# Patient Record
Sex: FEMALE | Race: WHITE | Employment: UNEMPLOYED | URBAN - METROPOLITAN AREA
[De-identification: names, ages, dates, MRNs, and addresses within clinical notes are randomized per-mention and may not be internally consistent; named-entity substitution may affect disease eponyms.]

---

## 2018-10-06 ENCOUNTER — APPOINTMENT (OUTPATIENT)
Dept: CT IMAGING | Age: 23
End: 2018-10-06
Payer: COMMERCIAL

## 2018-10-06 ENCOUNTER — HOSPITAL ENCOUNTER (EMERGENCY)
Age: 23
Discharge: HOME | End: 2018-10-06
Attending: EMERGENCY MEDICINE
Payer: COMMERCIAL

## 2018-10-06 VITALS
RESPIRATION RATE: 16 BRPM | BODY MASS INDEX: 36.31 KG/M2 | SYSTOLIC BLOOD PRESSURE: 94 MMHG | DIASTOLIC BLOOD PRESSURE: 61 MMHG | OXYGEN SATURATION: 100 % | HEIGHT: 63 IN | TEMPERATURE: 97.2 F | HEART RATE: 84 BPM

## 2018-10-06 DIAGNOSIS — N83.202 CYST OF LEFT OVARY: ICD-10-CM

## 2018-10-06 DIAGNOSIS — R10.9 ABDOMINAL PAIN, UNSPECIFIED ABDOMINAL LOCATION: ICD-10-CM

## 2018-10-06 DIAGNOSIS — A08.4 STOMACH FLU: Primary | ICD-10-CM

## 2018-10-06 LAB
ACETAMINOPHEN LEVEL: <5 MCG/ML (ref 10–30)
ALBUMIN SERPL-MCNC: 4.4 G/DL (ref 3.5–5.2)
ALP BLD-CCNC: 97 U/L (ref 35–104)
ALT SERPL-CCNC: 7 U/L (ref 0–32)
AMPHETAMINE SCREEN, URINE: NOT DETECTED
ANION GAP SERPL CALCULATED.3IONS-SCNC: 14 MMOL/L (ref 7–16)
AST SERPL-CCNC: 16 U/L (ref 0–31)
BACTERIA: ABNORMAL /HPF
BARBITURATE SCREEN URINE: NOT DETECTED
BASOPHILS ABSOLUTE: 0.02 E9/L (ref 0–0.2)
BASOPHILS RELATIVE PERCENT: 0.2 % (ref 0–2)
BENZODIAZEPINE SCREEN, URINE: NOT DETECTED
BILIRUB SERPL-MCNC: 1.7 MG/DL (ref 0–1.2)
BILIRUBIN DIRECT: 0.3 MG/DL (ref 0–0.3)
BILIRUBIN URINE: NEGATIVE
BILIRUBIN, INDIRECT: 1.4 MG/DL (ref 0–1)
BLOOD, URINE: ABNORMAL
BUN BLDV-MCNC: 15 MG/DL (ref 6–20)
CALCIUM SERPL-MCNC: 9.9 MG/DL (ref 8.6–10.2)
CANNABINOID SCREEN URINE: NOT DETECTED
CHLORIDE BLD-SCNC: 104 MMOL/L (ref 98–107)
CHP ED QC CHECK: YES
CLARITY: CLEAR
CO2: 21 MMOL/L (ref 22–29)
COCAINE METABOLITE SCREEN URINE: NOT DETECTED
COLOR: YELLOW
CREAT SERPL-MCNC: 0.7 MG/DL (ref 0.5–1)
EOSINOPHILS ABSOLUTE: 0 E9/L (ref 0.05–0.5)
EOSINOPHILS RELATIVE PERCENT: 0 % (ref 0–6)
EPITHELIAL CELLS, UA: ABNORMAL /HPF
ETHANOL: <10 MG/DL (ref 0–0.08)
GFR AFRICAN AMERICAN: >60
GFR NON-AFRICAN AMERICAN: >60 ML/MIN/1.73
GLUCOSE BLD-MCNC: 136 MG/DL (ref 74–109)
GLUCOSE URINE: NEGATIVE MG/DL
HCT VFR BLD CALC: 36 % (ref 34–48)
HEMOGLOBIN: 12.2 G/DL (ref 11.5–15.5)
IMMATURE GRANULOCYTES #: 0.04 E9/L
IMMATURE GRANULOCYTES %: 0.4 % (ref 0–5)
KETONES, URINE: >=80 MG/DL
LACTIC ACID: 1.3 MMOL/L (ref 0.5–2.2)
LEUKOCYTE ESTERASE, URINE: NEGATIVE
LIPASE: 21 U/L (ref 13–60)
LYMPHOCYTES ABSOLUTE: 0.67 E9/L (ref 1.5–4)
LYMPHOCYTES RELATIVE PERCENT: 7 % (ref 20–42)
MCH RBC QN AUTO: 30.3 PG (ref 26–35)
MCHC RBC AUTO-ENTMCNC: 33.9 % (ref 32–34.5)
MCV RBC AUTO: 89.6 FL (ref 80–99.9)
METHADONE SCREEN, URINE: NOT DETECTED
MONOCYTES ABSOLUTE: 0.33 E9/L (ref 0.1–0.95)
MONOCYTES RELATIVE PERCENT: 3.4 % (ref 2–12)
NEUTROPHILS ABSOLUTE: 8.55 E9/L (ref 1.8–7.3)
NEUTROPHILS RELATIVE PERCENT: 89 % (ref 43–80)
NITRITE, URINE: NEGATIVE
OPIATE SCREEN URINE: NOT DETECTED
PDW BLD-RTO: 13.2 FL (ref 11.5–15)
PH UA: 5.5 (ref 5–9)
PHENCYCLIDINE SCREEN URINE: NOT DETECTED
PLATELET # BLD: 160 E9/L (ref 130–450)
PMV BLD AUTO: 13.6 FL (ref 7–12)
POTASSIUM SERPL-SCNC: 3.7 MMOL/L (ref 3.5–5)
PREGNANCY TEST URINE, POC: NEGATIVE
PROPOXYPHENE SCREEN: NOT DETECTED
PROTEIN UA: NEGATIVE MG/DL
RBC # BLD: 4.02 E12/L (ref 3.5–5.5)
RBC UA: ABNORMAL /HPF (ref 0–2)
SALICYLATE, SERUM: <0.3 MG/DL (ref 0–30)
SODIUM BLD-SCNC: 139 MMOL/L (ref 132–146)
SPECIFIC GRAVITY UA: >=1.03 (ref 1–1.03)
TOTAL PROTEIN: 7.4 G/DL (ref 6.4–8.3)
TRICYCLIC ANTIDEPRESSANTS SCREEN SERUM: NEGATIVE NG/ML
UROBILINOGEN, URINE: 0.2 E.U./DL
WBC # BLD: 9.6 E9/L (ref 4.5–11.5)
WBC UA: ABNORMAL /HPF (ref 0–5)

## 2018-10-06 PROCEDURE — G0480 DRUG TEST DEF 1-7 CLASSES: HCPCS

## 2018-10-06 PROCEDURE — 83690 ASSAY OF LIPASE: CPT

## 2018-10-06 PROCEDURE — 81001 URINALYSIS AUTO W/SCOPE: CPT

## 2018-10-06 PROCEDURE — 74176 CT ABD & PELVIS W/O CONTRAST: CPT

## 2018-10-06 PROCEDURE — 2580000003 HC RX 258: Performed by: PHYSICIAN ASSISTANT

## 2018-10-06 PROCEDURE — 80048 BASIC METABOLIC PNL TOTAL CA: CPT

## 2018-10-06 PROCEDURE — 99284 EMERGENCY DEPT VISIT MOD MDM: CPT

## 2018-10-06 PROCEDURE — 85025 COMPLETE CBC W/AUTO DIFF WBC: CPT

## 2018-10-06 PROCEDURE — 6360000002 HC RX W HCPCS: Performed by: PHYSICIAN ASSISTANT

## 2018-10-06 PROCEDURE — 74177 CT ABD & PELVIS W/CONTRAST: CPT

## 2018-10-06 PROCEDURE — 96374 THER/PROPH/DIAG INJ IV PUSH: CPT

## 2018-10-06 PROCEDURE — 6360000004 HC RX CONTRAST MEDICATION: Performed by: RADIOLOGY

## 2018-10-06 PROCEDURE — 96375 TX/PRO/DX INJ NEW DRUG ADDON: CPT

## 2018-10-06 PROCEDURE — 80076 HEPATIC FUNCTION PANEL: CPT

## 2018-10-06 PROCEDURE — 80307 DRUG TEST PRSMV CHEM ANLYZR: CPT

## 2018-10-06 PROCEDURE — 83605 ASSAY OF LACTIC ACID: CPT

## 2018-10-06 RX ORDER — 0.9 % SODIUM CHLORIDE 0.9 %
1000 INTRAVENOUS SOLUTION INTRAVENOUS ONCE
Status: COMPLETED | OUTPATIENT
Start: 2018-10-06 | End: 2018-10-06

## 2018-10-06 RX ORDER — KETOROLAC TROMETHAMINE 30 MG/ML
15 INJECTION, SOLUTION INTRAMUSCULAR; INTRAVENOUS ONCE
Status: COMPLETED | OUTPATIENT
Start: 2018-10-06 | End: 2018-10-06

## 2018-10-06 RX ORDER — ONDANSETRON 2 MG/ML
4 INJECTION INTRAMUSCULAR; INTRAVENOUS ONCE
Status: COMPLETED | OUTPATIENT
Start: 2018-10-06 | End: 2018-10-06

## 2018-10-06 RX ORDER — ONDANSETRON 4 MG/1
4 TABLET, ORALLY DISINTEGRATING ORAL EVERY 8 HOURS PRN
Qty: 12 TABLET | Refills: 0 | Status: SHIPPED | OUTPATIENT
Start: 2018-10-06 | End: 2018-10-22 | Stop reason: ALTCHOICE

## 2018-10-06 RX ADMIN — ONDANSETRON 4 MG: 2 INJECTION INTRAMUSCULAR; INTRAVENOUS at 02:46

## 2018-10-06 RX ADMIN — IOHEXOL 50 ML: 240 INJECTION, SOLUTION INTRATHECAL; INTRAVASCULAR; INTRAVENOUS; ORAL at 06:51

## 2018-10-06 RX ADMIN — IOPAMIDOL 110 ML: 755 INJECTION, SOLUTION INTRAVENOUS at 03:29

## 2018-10-06 RX ADMIN — SODIUM CHLORIDE 1000 ML: 9 INJECTION, SOLUTION INTRAVENOUS at 02:46

## 2018-10-06 RX ADMIN — KETOROLAC TROMETHAMINE 15 MG: 30 INJECTION, SOLUTION INTRAMUSCULAR at 02:47

## 2018-10-06 ASSESSMENT — PAIN DESCRIPTION - ORIENTATION: ORIENTATION: RIGHT;LOWER

## 2018-10-06 ASSESSMENT — PAIN DESCRIPTION - PAIN TYPE: TYPE: ACUTE PAIN

## 2018-10-06 ASSESSMENT — PAIN SCALES - GENERAL
PAINLEVEL_OUTOF10: 7
PAINLEVEL_OUTOF10: 7
PAINLEVEL_OUTOF10: 0

## 2018-10-06 ASSESSMENT — PAIN DESCRIPTION - LOCATION: LOCATION: ABDOMEN

## 2018-10-06 NOTE — ED PROVIDER NOTES
Interpretation: Normal.    · General Appearance/Constitutional:  Alert, development consistent with age. · HEENT:  NC/NT. PERRLA. Airway patent. · Neck:  Supple. No lymphadenopathy. · Respiratory:  No retractions. Lungs Clear to auscultation and breath sounds equal.  · CV:  Regular rate and rhythm. · GI:  General Appearance: normal. Excess skin from previous obese state. Bowel sounds: hypoactive bowel sounds. Distension:  None. Tenderness: mild tenderness is present in the right flank, no rebound tenderness, no guarding, abdominal rigidity is absent. Liver: non-tender. Spleen:  non-tender. Pulsatile Mass: absent. Hernia:  no inguinal or femoral hernias noted. · Back: CVA Tenderness: No.  · : (chaperone present during examination). · Integument:  Normal turgor. Warm, dry, without visible rash, unless noted elsewhere. · Lymphatics: No edema, cap.refill <3sec. · Neurological:  Orientation age-appropriate. Motor functions intact.     Lab / Imaging Results   (All laboratory and radiology results have been personally reviewed by myself)  Labs:  Results for orders placed or performed during the hospital encounter of 10/06/18   CBC Auto Differential   Result Value Ref Range    WBC 9.6 4.5 - 11.5 E9/L    RBC 4.02 3.50 - 5.50 E12/L    Hemoglobin 12.2 11.5 - 15.5 g/dL    Hematocrit 36.0 34.0 - 48.0 %    MCV 89.6 80.0 - 99.9 fL    MCH 30.3 26.0 - 35.0 pg    MCHC 33.9 32.0 - 34.5 %    RDW 13.2 11.5 - 15.0 fL    Platelets 715 755 - 162 E9/L    MPV 13.6 (H) 7.0 - 12.0 fL    Neutrophils % 89.0 (H) 43.0 - 80.0 %    Immature Granulocytes % 0.4 0.0 - 5.0 %    Lymphocytes % 7.0 (L) 20.0 - 42.0 %    Monocytes % 3.4 2.0 - 12.0 %    Eosinophils % 0.0 0.0 - 6.0 %    Basophils % 0.2 0.0 - 2.0 %    Neutrophils # 8.55 (H) 1.80 - 7.30 E9/L    Immature Granulocytes # 0.04 E9/L    Lymphocytes # 0.67 (L) 1.50 - 4.00 E9/L    Monocytes # 0.33 0.10 - 0.95 agreeable with the plan. Assessment    Per Dr. Dickson Bateman Medications     New Prescriptions    No medications on file     Electronically signed by Dao Dc PA-C   DD: 10/6/18  **This report was transcribed using voice recognition software. Every effort was made to ensure accuracy; however, inadvertent computerized transcription errors may be present.   END OF ED PROVIDER NOTE       Dao Dc PA-C  10/06/18 [id number]

## 2018-10-06 NOTE — ED NOTES
Ambulatory to bathroom with steady gait, iv fluids maintained, awaiting results and disposition     Gail Matias  10/06/18 [id number]

## 2018-10-22 ENCOUNTER — TELEPHONE (OUTPATIENT)
Dept: NON INVASIVE DIAGNOSTICS | Age: 23
End: 2018-10-22

## 2018-10-22 PROBLEM — L70.9 ACNE: Status: ACTIVE | Noted: 2018-10-22

## 2018-10-23 ENCOUNTER — NURSE ONLY (OUTPATIENT)
Dept: NON INVASIVE DIAGNOSTICS | Age: 23
End: 2018-10-23

## 2018-10-23 NOTE — PROGRESS NOTES
Patient in today for 24 hour holter monitor. Holter monitor applied and instructions given. Patient verbalized understanding.

## 2018-10-31 ENCOUNTER — TELEPHONE (OUTPATIENT)
Dept: CARDIOLOGY | Age: 23
End: 2018-10-31

## 2018-10-31 NOTE — TELEPHONE ENCOUNTER
CALLED PATIENT TO CANCEL ECHO APPOINTMENT DUE TO AUTH IS PENDING FROM PCP. I CALLED PCP ON 10-26 @ 11;36 AND SPOKE WITH FREDERICK ABOUT  AN AUTH FOR ECHO.  SHE SAID NO AND WILL BE WORKING ON IT. ON 10-31 AUTH IS STILL PENDING @10:07AM,  Electronically signed by Marilyn Bartlett on 10/31/2018 at 2:49 PM

## 2018-11-30 ENCOUNTER — HOSPITAL ENCOUNTER (OUTPATIENT)
Dept: CARDIOLOGY | Age: 23
Discharge: HOME | End: 2018-11-30
Payer: COMMERCIAL

## 2018-11-30 LAB
LV EF: 60 %
LVEF MODALITY: NORMAL

## 2018-11-30 PROCEDURE — 93306 TTE W/DOPPLER COMPLETE: CPT

## 2018-12-14 PROBLEM — R00.0 TACHYCARDIA: Status: ACTIVE | Noted: 2018-12-14

## 2019-01-08 ENCOUNTER — HOSPITAL ENCOUNTER (EMERGENCY)
Age: 24
Discharge: LEFT BEFORE DISCHARGE | End: 2019-01-08
Payer: COMMERCIAL

## 2019-01-08 VITALS
TEMPERATURE: 98 F | HEART RATE: 90 BPM | OXYGEN SATURATION: 99 % | RESPIRATION RATE: 16 BRPM | BODY MASS INDEX: 26.63 KG/M2 | WEIGHT: 156 LBS | HEIGHT: 64 IN

## 2019-01-08 ASSESSMENT — PAIN DESCRIPTION - PAIN TYPE: TYPE: ACUTE PAIN

## 2019-01-08 ASSESSMENT — PAIN DESCRIPTION - LOCATION: LOCATION: RECTUM

## 2019-01-08 ASSESSMENT — PAIN SCALES - GENERAL: PAINLEVEL_OUTOF10: 5

## 2024-02-19 ENCOUNTER — NURSE TRIAGE (OUTPATIENT)
Dept: OTHER | Facility: CLINIC | Age: 29
End: 2024-02-19

## 2024-02-19 NOTE — TELEPHONE ENCOUNTER
Location of patient: [place]    Received call from Batsheva at [facility] with Red Flag Complaint.    Subjective: Caller and pt. Mother states not sure if blood sugar is high,  Dry mouth and voiding frequently   Weight loss 20 lbs in the past 6 months    Current Symptoms: frequent urination and dry mouth for the past year    Onset: 1 year ago     Associated Symptoms: NA    Pain Severity: denies     Temperature: denies     What has been tried:     LMP:  few weeks  ago Pregnant:no    Recommended disposition: See in Office Today/UCC if no appt available     Care advice provided, patient verbalizes understanding; denies any other questions or concerns; instructed to call back for any new or worsening symptoms.    Patient/Caller agrees with recommended disposition; writer provided warm transfer to Yolanda at [facility] for appointment scheduling    Attention Provider:  Thank you for allowing me to participate in the care of your patient.  The patient was connected to triage in response to information provided to the [facility].  Please do not respond through this encounter as the response is not directed to a shared pool.       Reason for Disposition   Symptoms of high blood sugar (e.g., abnormally thirsty, frequent urination, weight loss) and not able to test blood glucose    Protocols used: Diabetes - High Blood Sugar-ADULT-[place]

## 2024-03-06 ENCOUNTER — OFFICE VISIT (OUTPATIENT)
Dept: PRIMARY CARE CLINIC | Age: 29
End: 2024-03-06

## 2024-03-06 VITALS
RESPIRATION RATE: 17 BRPM | WEIGHT: 130 LBS | DIASTOLIC BLOOD PRESSURE: 82 MMHG | SYSTOLIC BLOOD PRESSURE: 128 MMHG | HEART RATE: 78 BPM | OXYGEN SATURATION: 98 % | HEIGHT: 64 IN | BODY MASS INDEX: 22.2 KG/M2 | TEMPERATURE: 98 F

## 2024-03-06 DIAGNOSIS — Z76.89 ENCOUNTER TO ESTABLISH CARE: ICD-10-CM

## 2024-03-06 DIAGNOSIS — Z11.59 NEED FOR HEPATITIS C SCREENING TEST: ICD-10-CM

## 2024-03-06 DIAGNOSIS — R35.89 POLYURIA: ICD-10-CM

## 2024-03-06 DIAGNOSIS — Z11.4 SCREENING FOR HIV WITHOUT PRESENCE OF RISK FACTORS: ICD-10-CM

## 2024-03-06 DIAGNOSIS — R63.4 WEIGHT LOSS: ICD-10-CM

## 2024-03-06 DIAGNOSIS — R73.9 HYPERGLYCEMIA: ICD-10-CM

## 2024-03-06 DIAGNOSIS — Z72.89 OTHER PROBLEMS RELATED TO LIFESTYLE: ICD-10-CM

## 2024-03-06 DIAGNOSIS — E04.9 THYROID ENLARGEMENT: ICD-10-CM

## 2024-03-06 DIAGNOSIS — R68.2 DRY MOUTH: ICD-10-CM

## 2024-03-06 DIAGNOSIS — Z00.00 ENCOUNTER FOR WELL ADULT EXAM WITHOUT ABNORMAL FINDINGS: Primary | ICD-10-CM

## 2024-03-06 PROBLEM — R00.0 TACHYCARDIA: Status: RESOLVED | Noted: 2018-12-14 | Resolved: 2024-03-06

## 2024-03-06 SDOH — ECONOMIC STABILITY: FOOD INSECURITY: WITHIN THE PAST 12 MONTHS, YOU WORRIED THAT YOUR FOOD WOULD RUN OUT BEFORE YOU GOT MONEY TO BUY MORE.: NEVER TRUE

## 2024-03-06 SDOH — ECONOMIC STABILITY: HOUSING INSECURITY
IN THE LAST 12 MONTHS, WAS THERE A TIME WHEN YOU DID NOT HAVE A STEADY PLACE TO SLEEP OR SLEPT IN A SHELTER (INCLUDING NOW)?: NO

## 2024-03-06 SDOH — ECONOMIC STABILITY: INCOME INSECURITY: HOW HARD IS IT FOR YOU TO PAY FOR THE VERY BASICS LIKE FOOD, HOUSING, MEDICAL CARE, AND HEATING?: NOT HARD AT ALL

## 2024-03-06 SDOH — ECONOMIC STABILITY: FOOD INSECURITY: WITHIN THE PAST 12 MONTHS, THE FOOD YOU BOUGHT JUST DIDN'T LAST AND YOU DIDN'T HAVE MONEY TO GET MORE.: NEVER TRUE

## 2024-03-06 ASSESSMENT — PATIENT HEALTH QUESTIONNAIRE - PHQ9
1. LITTLE INTEREST OR PLEASURE IN DOING THINGS: 0
SUM OF ALL RESPONSES TO PHQ QUESTIONS 1-9: 0
SUM OF ALL RESPONSES TO PHQ QUESTIONS 1-9: 0
2. FEELING DOWN, DEPRESSED OR HOPELESS: 0
SUM OF ALL RESPONSES TO PHQ QUESTIONS 1-9: 0
SUM OF ALL RESPONSES TO PHQ QUESTIONS 1-9: 0
SUM OF ALL RESPONSES TO PHQ9 QUESTIONS 1 & 2: 0

## 2024-03-06 ASSESSMENT — ANXIETY QUESTIONNAIRES
4. TROUBLE RELAXING: 1
2. NOT BEING ABLE TO STOP OR CONTROL WORRYING: 1
7. FEELING AFRAID AS IF SOMETHING AWFUL MIGHT HAPPEN: 1
5. BEING SO RESTLESS THAT IT IS HARD TO SIT STILL: 1
6. BECOMING EASILY ANNOYED OR IRRITABLE: 1
1. FEELING NERVOUS, ANXIOUS, OR ON EDGE: 1
IF YOU CHECKED OFF ANY PROBLEMS ON THIS QUESTIONNAIRE, HOW DIFFICULT HAVE THESE PROBLEMS MADE IT FOR YOU TO DO YOUR WORK, TAKE CARE OF THINGS AT HOME, OR GET ALONG WITH OTHER PEOPLE: NOT DIFFICULT AT ALL
3. WORRYING TOO MUCH ABOUT DIFFERENT THINGS: 1
GAD7 TOTAL SCORE: 7

## 2024-03-06 NOTE — PATIENT INSTRUCTIONS
Advance Care Planning     Advance Care Planning opens a door to talk about and write down your wishes before a sudden accident or illness.  Make your goals, values, and preferences known.     This puts you in the ’s seat and helps others know what matters most to you so they won’t have to guess.      Where can you learn more?    Go to [email or web address]   to learn how to:    Name someone you trust to make healthcare decisions for you, only if you can’t. (Healthcare Power of )    Document your wishes for care if you were seriously ill and not expected to recover or are approaching end of life. (Advance Directive or Living Will)    The same page can be found using the QR code below.                Well Visit, Ages 18 to 65: Care Instructions  Well visits can help you stay healthy. Your doctor has checked your overall health and may have suggested ways to take good care of yourself. Your doctor also may have recommended tests. You can help prevent illness with healthy eating, good sleep, vaccinations, regular exercise, and other steps.    Get the tests that you and your doctor decide on. Depending on your age and risks, examples might include screening for diabetes; hepatitis C; HIV; and cervical, breast, lung, and colon cancer. Screening helps find diseases before any symptoms appear.   Eat healthy foods. Choose fruits, vegetables, whole grains, lean protein, and low-fat dairy foods. Limit saturated fat and reduce salt.     Limit alcohol. Men should have no more than 2 drinks a day. Women should have no more than 1. For some people, no alcohol is the best choice.   Exercise. Get at least 30 minutes of exercise on most days of the week. Walking can be a good choice.     Reach and stay at your healthy weight. This will lower your risk for many health problems.   Take care of your mental health. Try to stay connected with friends, family, and community, and find

## 2024-03-06 NOTE — PROGRESS NOTES
Well Adult Note  Name: Sandrine Case Today’s Date: 3/6/2024   MRN: [id number] Sex: Female   Age: 28 y.o. Ethnicity: Non- / Non    : 1995 Race: White (non-)      Sandrine Case is here for well adult exam.  History:  Vaginal itch and pain  Polyuria, no dysuria  Mom with type 2 Dm  Lost 20 lbs in 6 months, not trying  Hx of tachy bc of nervousness, holter done    Review of Systems   All other systems reviewed and are negative.      No Known Allergies      Prior to Visit Medications    Medication Sig Taking? Authorizing Provider   ibuprofen (ADVIL;MOTRIN) 600 MG tablet Take 1 tablet by mouth 3 times daily as needed for Pain Take with food Yes Nemo Calderón MD   Cinnamon 500 MG CAPS Take 1 capsule by mouth daily Yes ProviderSandrine MD       History reviewed. No pertinent past medical history.    Past Surgical History:   Procedure Laterality Date    ADENOIDECTOMY      WISDOM TOOTH EXTRACTION         History reviewed. No pertinent family history.    Social History     Tobacco Use    Smoking status: Never    Smokeless tobacco: Never   Substance Use Topics    Alcohol use: Yes     Comment: occassionally    Drug use: No       Objective     Vital Signs  /82   Pulse 78   Temp 98 °F (36.7 °C)   Resp 17   Ht 1.626 m (5' 4.02\")   Wt 59 kg (130 lb)   SpO2 98%   BMI 22.30 kg/m²   Wt Readings from Last 3 Encounters:   24 59 kg (130 lb)   10/09/19 64.4 kg (142 lb)   19 61.7 kg (136 lb)       Waist Circumference  There were no vitals filed for this visit.    Physical Exam  Constitutional:       General: She is not in acute distress.     Appearance: Normal appearance. She is well-developed.   HENT:      Head: Normocephalic and atraumatic.      Right Ear: External ear normal.      Left Ear: External ear normal.      Nose: Nose normal.   Eyes:      General: No scleral icterus.     Conjunctiva/sclera: Conjunctivae normal.      Pupils: Pupils are equal, round,

## 2024-03-07 LAB
ABSOLUTE IMMATURE GRANULOCYTE: <0.03 K/UL (ref 0–0.58)
ALBUMIN SERPL-MCNC: 5.4 G/DL (ref 3.5–5.2)
ANION GAP SERPL CALCULATED.3IONS-SCNC: 21 MMOL/L (ref 7–16)
AST SERPL-CCNC: 15 U/L (ref 0–31)
BASOPHILS RELATIVE PERCENT: 1 % (ref 0–2)
BILIRUB SERPL-MCNC: 2.2 MG/DL (ref 0–1.2)
BUN BLDV-MCNC: 18 MG/DL (ref 6–20)
CHLORIDE BLD-SCNC: 100 MMOL/L (ref 98–107)
CO2: 19 MMOL/L (ref 22–29)
CREAT SERPL-MCNC: 0.7 MG/DL (ref 0.5–1)
EOSINOPHILS ABSOLUTE: 0.04 K/UL (ref 0.05–0.5)
EOSINOPHILS RELATIVE PERCENT: 1 % (ref 0–6)
GFR SERPL CREATININE-BSD FRML MDRD: >60 ML/MIN/1.73M2
GLUCOSE BLD-MCNC: 82 MG/DL (ref 74–99)
HBA1C MFR BLD: 4.9 % (ref 4–5.6)
HCT VFR BLD CALC: 44.4 % (ref 34–48)
HEMOGLOBIN: 14.5 G/DL (ref 11.5–15.5)
IMMATURE GRANULOCYTES: 0 % (ref 0–5)
LYMPHOCYTES ABSOLUTE: 1.31 K/UL (ref 1.5–4)
LYMPHOCYTES RELATIVE PERCENT: 33 % (ref 20–42)
MCHC RBC AUTO-ENTMCNC: 32.7 G/DL (ref 32–34.5)
MCV RBC AUTO: 89 FL (ref 80–99.9)
MONOCYTES ABSOLUTE: 0.3 K/UL (ref 0.1–0.95)
MONOCYTES RELATIVE PERCENT: 8 % (ref 2–12)
NEUTROPHILS ABSOLUTE: 2.29 K/UL (ref 1.8–7.3)
NEUTROPHILS RELATIVE PERCENT: 58 % (ref 43–80)
PDW BLD-RTO: 12.8 % (ref 11.5–15)
PLATELET, FLUORESCENCE: 217 K/UL (ref 130–450)
PMV BLD AUTO: 13 FL (ref 7–12)
POTASSIUM SERPL-SCNC: 4.1 MMOL/L (ref 3.5–5)
RBC # BLD: 4.99 M/UL (ref 3.5–5.5)
SEDIMENTATION RATE, ERYTHROCYTE: 5 MM/HR (ref 0–20)
SODIUM BLD-SCNC: 140 MMOL/L (ref 132–146)
T4 FREE: 1.4 NG/DL (ref 0.9–1.7)
TOTAL PROTEIN: 8.6 G/DL (ref 6.4–8.3)
TSH SERPL DL<=0.05 MIU/L-ACNC: 0.77 UIU/ML (ref 0.27–4.2)
WBC # BLD: 4 K/UL (ref 4.5–11.5)

## 2024-03-14 ENCOUNTER — HOSPITAL ENCOUNTER (OUTPATIENT)
Dept: ULTRASOUND IMAGING | Age: 29
Discharge: HOME | End: 2024-03-16
Payer: COMMERCIAL

## 2024-03-14 DIAGNOSIS — E04.9 THYROID ENLARGEMENT: ICD-10-CM

## 2024-03-14 PROCEDURE — 76536 US EXAM OF HEAD AND NECK: CPT

## 2024-03-20 ENCOUNTER — OFFICE VISIT (OUTPATIENT)
Dept: PRIMARY CARE CLINIC | Age: 29
End: 2024-03-20
Payer: COMMERCIAL

## 2024-03-20 VITALS
WEIGHT: 130 LBS | BODY MASS INDEX: 22.2 KG/M2 | RESPIRATION RATE: 17 BRPM | HEIGHT: 64 IN | OXYGEN SATURATION: 98 % | SYSTOLIC BLOOD PRESSURE: 100 MMHG | DIASTOLIC BLOOD PRESSURE: 60 MMHG | HEART RATE: 117 BPM | TEMPERATURE: 98 F

## 2024-03-20 DIAGNOSIS — R63.4 WEIGHT LOSS: ICD-10-CM

## 2024-03-20 DIAGNOSIS — E04.9 THYROID ENLARGEMENT: ICD-10-CM

## 2024-03-20 DIAGNOSIS — Z11.4 SCREENING FOR HIV WITHOUT PRESENCE OF RISK FACTORS: ICD-10-CM

## 2024-03-20 DIAGNOSIS — E04.1 THYROID NODULE: Primary | ICD-10-CM

## 2024-03-20 DIAGNOSIS — E83.52 HYPERCALCEMIA: ICD-10-CM

## 2024-03-20 DIAGNOSIS — Z11.59 NEED FOR HEPATITIS C SCREENING TEST: ICD-10-CM

## 2024-03-20 DIAGNOSIS — R68.2 DRY MOUTH: ICD-10-CM

## 2024-03-20 DIAGNOSIS — D72.819 LEUKOPENIA, UNSPECIFIED TYPE: ICD-10-CM

## 2024-03-20 DIAGNOSIS — Z72.89 OTHER PROBLEMS RELATED TO LIFESTYLE: ICD-10-CM

## 2024-03-20 LAB
ABSOLUTE IMMATURE GRANULOCYTE: <0.03 K/UL (ref 0–0.58)
ALBUMIN SERPL-MCNC: 5 G/DL (ref 3.5–5.2)
ALP BLD-CCNC: 101 U/L (ref 35–104)
ALT SERPL-CCNC: 8 U/L (ref 0–32)
ANION GAP SERPL CALCULATED.3IONS-SCNC: 11 MMOL/L (ref 7–16)
AST SERPL-CCNC: 14 U/L (ref 0–31)
BASOPHILS ABSOLUTE: 0.03 K/UL (ref 0–0.2)
BASOPHILS RELATIVE PERCENT: 1 % (ref 0–2)
BILIRUB SERPL-MCNC: 1.9 MG/DL (ref 0–1.2)
BUN BLDV-MCNC: 23 MG/DL (ref 6–20)
CALCIUM SERPL-MCNC: 9.9 MG/DL (ref 8.6–10.2)
CHLORIDE BLD-SCNC: 103 MMOL/L (ref 98–107)
CO2: 24 MMOL/L (ref 22–29)
CREAT SERPL-MCNC: 0.8 MG/DL (ref 0.5–1)
EOSINOPHILS ABSOLUTE: 0.01 K/UL (ref 0.05–0.5)
EOSINOPHILS RELATIVE PERCENT: 0 % (ref 0–6)
GFR SERPL CREATININE-BSD FRML MDRD: >60 ML/MIN/1.73M2
GLUCOSE BLD-MCNC: 102 MG/DL (ref 74–99)
HCT VFR BLD CALC: 39 % (ref 34–48)
HEMOGLOBIN: 12.8 G/DL (ref 11.5–15.5)
IMMATURE GRANULOCYTES: 0 % (ref 0–5)
LYMPHOCYTES ABSOLUTE: 1.26 K/UL (ref 1.5–4)
LYMPHOCYTES RELATIVE PERCENT: 25 % (ref 20–42)
MCH RBC QN AUTO: 29.1 PG (ref 26–35)
MCHC RBC AUTO-ENTMCNC: 32.8 G/DL (ref 32–34.5)
MCV RBC AUTO: 88.6 FL (ref 80–99.9)
MONOCYTES ABSOLUTE: 0.36 K/UL (ref 0.1–0.95)
MONOCYTES RELATIVE PERCENT: 7 % (ref 2–12)
NEUTROPHILS ABSOLUTE: 3.41 K/UL (ref 1.8–7.3)
NEUTROPHILS RELATIVE PERCENT: 67 % (ref 43–80)
PDW BLD-RTO: 12.6 % (ref 11.5–15)
PLATELET # BLD: 170 K/UL (ref 130–450)
PMV BLD AUTO: 12.8 FL (ref 7–12)
POTASSIUM SERPL-SCNC: 4 MMOL/L (ref 3.5–5)
RBC # BLD: 4.4 M/UL (ref 3.5–5.5)
SODIUM BLD-SCNC: 138 MMOL/L (ref 132–146)
TOTAL PROTEIN: 7.8 G/DL (ref 6.4–8.3)
VITAMIN D 25-HYDROXY: 87.8 NG/ML (ref 30–100)
WBC # BLD: 5.1 K/UL (ref 4.5–11.5)

## 2024-03-20 PROCEDURE — G8420 CALC BMI NORM PARAMETERS: HCPCS | Performed by: PHYSICIAN ASSISTANT

## 2024-03-20 PROCEDURE — 99214 OFFICE O/P EST MOD 30 MIN: CPT | Performed by: PHYSICIAN ASSISTANT

## 2024-03-20 PROCEDURE — G8427 DOCREV CUR MEDS BY ELIG CLIN: HCPCS | Performed by: PHYSICIAN ASSISTANT

## 2024-03-20 PROCEDURE — 36415 COLL VENOUS BLD VENIPUNCTURE: CPT | Performed by: PHYSICIAN ASSISTANT

## 2024-03-20 PROCEDURE — 1036F TOBACCO NON-USER: CPT | Performed by: PHYSICIAN ASSISTANT

## 2024-03-20 PROCEDURE — G8484 FLU IMMUNIZE NO ADMIN: HCPCS | Performed by: PHYSICIAN ASSISTANT

## 2024-03-21 ENCOUNTER — HOSPITAL ENCOUNTER (OUTPATIENT)
Dept: GENERAL RADIOLOGY | Age: 29
Discharge: HOME | End: 2024-03-23
Payer: COMMERCIAL

## 2024-03-21 ENCOUNTER — HOSPITAL ENCOUNTER (OUTPATIENT)
Age: 29
Discharge: HOME | End: 2024-03-23
Payer: COMMERCIAL

## 2024-03-21 DIAGNOSIS — D72.819 LEUKOPENIA, UNSPECIFIED TYPE: ICD-10-CM

## 2024-03-21 DIAGNOSIS — R63.4 WEIGHT LOSS: ICD-10-CM

## 2024-03-21 LAB
HEPATITIS C ANTIBODY: NONREACTIVE
HIV AG/AB: NONREACTIVE

## 2024-03-21 PROCEDURE — 71046 X-RAY EXAM CHEST 2 VIEWS: CPT

## 2024-03-22 NOTE — PROGRESS NOTES
Lab draw Right AC 22 x 1 1/4\". Venipuncture x 1  
normal.      Pupils: Pupils are equal, round, and reactive to light.   Neck:      Thyroid: Thyroid mass (right side, supraclavicular) present. No thyromegaly.   Cardiovascular:      Rate and Rhythm: Normal rate and regular rhythm.      Heart sounds: Normal heart sounds. No murmur heard.  Pulmonary:      Effort: Pulmonary effort is normal. No accessory muscle usage or respiratory distress.      Breath sounds: Normal breath sounds. No wheezing.   Musculoskeletal:         General: Normal range of motion.      Cervical back: Normal range of motion and neck supple.   Skin:     General: Skin is warm and dry.      Findings: No rash.   Neurological:      Mental Status: She is alert and oriented to person, place, and time.      Deep Tendon Reflexes: Reflexes are normal and symmetric.   Psychiatric:         Mood and Affect: Mood and affect normal.         Speech: Speech normal.         Behavior: Behavior normal.         Assessment/Plan:      Sandrine was seen today for 2 week follow-up and results.    Diagnoses and all orders for this visit:    Thyroid nodule  -     Cancel: IR US THYROID BIOPSY; Future  -     Dominic Saldivar DO, Endocrinology, [place] [facility]  -     IR US THYROID BIOPSY; Future    Hypercalcemia  -     PTH, Intact; Future  -     Cancel: Comprehensive Metabolic Panel  -     Dominic Saldivar DO, Endocrinology, [place] [facility]  -     Comprehensive Metabolic Panel    Leukopenia, unspecified type  -     Cancel: CBC with Auto Differential  -     CBC with Auto Differential  -     XR CHEST STANDARD (2 VW); Future    Thyroid enlargement  -     Dominic Saldivar DO, Endocrinology, [place] [facility]    Screening for HIV without presence of risk factors  -     HIV Screen    Other problems related to lifestyle  -     Hepatitis C Antibody    Need for hepatitis C screening test  -     Hepatitis C Antibody    Dry mouth  -     Vitamin D 25 Hydroxy    Weight loss  -     Vitamin D 25 Hydroxy  -     XR CHEST STANDARD (2 VW);

## 2024-04-10 ENCOUNTER — HOSPITAL ENCOUNTER (OUTPATIENT)
Dept: ULTRASOUND IMAGING | Age: 29
Discharge: HOME | End: 2024-04-12
Payer: COMMERCIAL

## 2024-04-10 DIAGNOSIS — E04.1 THYROID NODULE: ICD-10-CM

## 2024-04-10 PROCEDURE — 88172 CYTP DX EVAL FNA 1ST EA SITE: CPT

## 2024-04-10 PROCEDURE — 88173 CYTOPATH EVAL FNA REPORT: CPT

## 2024-04-10 PROCEDURE — 88305 TISSUE EXAM BY PATHOLOGIST: CPT

## 2024-04-10 PROCEDURE — 10005 FNA BX W/US GDN 1ST LES: CPT

## 2024-04-10 NOTE — DISCHARGE INSTRUCTIONS
Discharge Instructions for Needle Biopsy: Thyroid     A needle biopsy is done to remove a small sample of tissue from your thyroid gland. The sample is then tested to see if there are malignant (cancerous) or benign (noncancerous) cells.   There are two types of biopsies:   Fine-needle aspiration (FNA)the most common type    Coarse-needle biopsy (CNB)   This procedure is usually done in the doctor's office, but may also be done as an outpatient procedure at the hospital.   Steps to Take   Home Care    In most cases, you can return home or to work without any negative effects.   After a FNA, you should not have any pain or tenderness. For a CNB, you may feel soreness at the site of the biopsy for 1-2 days after the test.   There are no complications associated with a FNA. A CNB may cause bleeding into the thyroid gland.   For a FNA, remove the bandage after a few hours.    For a CNB, remove the bandage after a few days. Ask your doctor if you can shower and bathe.    Diet    Resume your normal diet.    Physical Activity    Avoid vigorous physical activity for 24 hours.    Ask your doctor when it is safe for you to drive.    Medications    If you had to stop medicines before the procedure, ask your doctor when you can start again. Medicines often stopped include:   Anti-inflammatory drugs (eg, aspirin )   Blood thinners like clopidogrel (Plavix) or warfarin (Coumadin)   Your doctor may tell you to take an over-the-counter pain medicine, such as acetaminophen.   If you are taking medications, follow these general guidelines:   Take your medication as directed. Do not change the amount or the schedule.   Do not stop taking them without talking to your doctor.   Do not share them.   Know what the results and side effects. Report them to your doctor.   Some drugs can be dangerous when mixed. Talk to a doctor or pharmacist if you are taking more than one drug. This includes over-the-counter medication and herb or dietary

## 2024-04-12 LAB — NON-GYN CYTOLOGY REPORT: NORMAL

## 2024-04-17 DIAGNOSIS — E04.9 THYROID ENLARGEMENT: Primary | ICD-10-CM

## 2024-04-17 DIAGNOSIS — E07.9 THYROID MASS: ICD-10-CM

## 2024-04-18 ENCOUNTER — APPOINTMENT (OUTPATIENT)
Dept: CT IMAGING | Age: 29
End: 2024-04-18
Payer: COMMERCIAL

## 2024-04-18 ENCOUNTER — TELEPHONE (OUTPATIENT)
Dept: PRIMARY CARE CLINIC | Age: 29
End: 2024-04-18

## 2024-04-18 ENCOUNTER — HOSPITAL ENCOUNTER (EMERGENCY)
Age: 29
Discharge: HOME | End: 2024-04-18
Attending: EMERGENCY MEDICINE
Payer: COMMERCIAL

## 2024-04-18 VITALS
OXYGEN SATURATION: 100 % | WEIGHT: 130 LBS | RESPIRATION RATE: 16 BRPM | SYSTOLIC BLOOD PRESSURE: 107 MMHG | DIASTOLIC BLOOD PRESSURE: 71 MMHG | BODY MASS INDEX: 22.2 KG/M2 | HEART RATE: 100 BPM | TEMPERATURE: 97.9 F | HEIGHT: 64 IN

## 2024-04-18 DIAGNOSIS — E07.9 THYROID MASS: Primary | ICD-10-CM

## 2024-04-18 LAB
ALBUMIN SERPL-MCNC: 5 G/DL (ref 3.5–5.2)
ALP SERPL-CCNC: 98 U/L (ref 35–104)
ALT SERPL-CCNC: 10 U/L (ref 0–32)
ANION GAP SERPL CALCULATED.3IONS-SCNC: 12 MMOL/L (ref 7–16)
AST SERPL-CCNC: 17 U/L (ref 0–31)
BASOPHILS # BLD: 0.03 K/UL (ref 0–0.2)
BASOPHILS NFR BLD: 0 % (ref 0–2)
BILIRUB SERPL-MCNC: 1 MG/DL (ref 0–1.2)
BUN SERPL-MCNC: 21 MG/DL (ref 6–20)
CALCIUM SERPL-MCNC: 9.8 MG/DL (ref 8.6–10.2)
CHLORIDE SERPL-SCNC: 102 MMOL/L (ref 98–107)
CO2 SERPL-SCNC: 23 MMOL/L (ref 22–29)
CREAT SERPL-MCNC: 1 MG/DL (ref 0.5–1)
EOSINOPHIL # BLD: 0.01 K/UL (ref 0.05–0.5)
EOSINOPHILS RELATIVE PERCENT: 0 % (ref 0–6)
ERYTHROCYTE [DISTWIDTH] IN BLOOD BY AUTOMATED COUNT: 13.1 % (ref 11.5–15)
GFR SERPL CREATININE-BSD FRML MDRD: 83 ML/MIN/1.73M2
GLUCOSE BLD-MCNC: 86 MG/DL (ref 74–99)
GLUCOSE SERPL-MCNC: 100 MG/DL (ref 74–99)
HCG, URINE, POC: NEGATIVE
HCT VFR BLD AUTO: 37.7 % (ref 34–48)
HGB BLD-MCNC: 12.3 G/DL (ref 11.5–15.5)
IMM GRANULOCYTES # BLD AUTO: <0.03 K/UL (ref 0–0.58)
IMM GRANULOCYTES NFR BLD: 0 % (ref 0–5)
INR PPP: 1.1
LYMPHOCYTES NFR BLD: 1.97 K/UL (ref 1.5–4)
LYMPHOCYTES RELATIVE PERCENT: 23 % (ref 20–42)
Lab: NORMAL
MCH RBC QN AUTO: 29.1 PG (ref 26–35)
MCHC RBC AUTO-ENTMCNC: 32.6 G/DL (ref 32–34.5)
MCV RBC AUTO: 89.3 FL (ref 80–99.9)
MONOCYTES NFR BLD: 0.49 K/UL (ref 0.1–0.95)
MONOCYTES NFR BLD: 6 % (ref 2–12)
NEGATIVE QC PASS/FAIL: NORMAL
NEUTROPHILS NFR BLD: 70 % (ref 43–80)
NEUTS SEG NFR BLD: 5.93 K/UL (ref 1.8–7.3)
PLATELET # BLD AUTO: 216 K/UL (ref 130–450)
PMV BLD AUTO: 12.3 FL (ref 7–12)
POSITIVE QC PASS/FAIL: NORMAL
POTASSIUM SERPL-SCNC: 3.6 MMOL/L (ref 3.5–5)
PROT SERPL-MCNC: 7.7 G/DL (ref 6.4–8.3)
PROTHROMBIN TIME: 12.3 SEC (ref 9.3–12.4)
RBC # BLD AUTO: 4.22 M/UL (ref 3.5–5.5)
SODIUM SERPL-SCNC: 137 MMOL/L (ref 132–146)
WBC OTHER # BLD: 8.5 K/UL (ref 4.5–11.5)

## 2024-04-18 PROCEDURE — 6360000004 HC RX CONTRAST MEDICATION: Performed by: RADIOLOGY

## 2024-04-18 PROCEDURE — 96374 THER/PROPH/DIAG INJ IV PUSH: CPT

## 2024-04-18 PROCEDURE — 6360000002 HC RX W HCPCS

## 2024-04-18 PROCEDURE — 80053 COMPREHEN METABOLIC PANEL: CPT

## 2024-04-18 PROCEDURE — 85025 COMPLETE CBC W/AUTO DIFF WBC: CPT

## 2024-04-18 PROCEDURE — 82962 GLUCOSE BLOOD TEST: CPT

## 2024-04-18 PROCEDURE — 85610 PROTHROMBIN TIME: CPT

## 2024-04-18 PROCEDURE — 70491 CT SOFT TISSUE NECK W/DYE: CPT

## 2024-04-18 PROCEDURE — 99285 EMERGENCY DEPT VISIT HI MDM: CPT

## 2024-04-18 RX ORDER — DEXAMETHASONE SODIUM PHOSPHATE 10 MG/ML
10 INJECTION INTRAMUSCULAR; INTRAVENOUS ONCE
Status: COMPLETED | OUTPATIENT
Start: 2024-04-18 | End: 2024-04-18

## 2024-04-18 RX ADMIN — DEXAMETHASONE SODIUM PHOSPHATE 10 MG: 10 INJECTION INTRAMUSCULAR; INTRAVENOUS at 21:39

## 2024-04-18 RX ADMIN — IOPAMIDOL 75 ML: 755 INJECTION, SOLUTION INTRAVENOUS at 18:57

## 2024-04-18 ASSESSMENT — PAIN - FUNCTIONAL ASSESSMENT: PAIN_FUNCTIONAL_ASSESSMENT: NONE - DENIES PAIN

## 2024-04-18 ASSESSMENT — LIFESTYLE VARIABLES
HOW OFTEN DO YOU HAVE A DRINK CONTAINING ALCOHOL: NEVER
HOW MANY STANDARD DRINKS CONTAINING ALCOHOL DO YOU HAVE ON A TYPICAL DAY: PATIENT DOES NOT DRINK

## 2024-04-18 NOTE — TELEPHONE ENCOUNTER
Pts mom states you called her last night and left a vm about thyroid biopsy results. Pt will wait until tomorrow to hear from you but as of right now mother states it has been 8 days since the procedure and pt is still sore where they took the biopsy

## 2024-04-18 NOTE — ED PROVIDER NOTES
[facility] EMERGENCY DEPARTMENT  EMERGENCY DEPARTMENT ENCOUNTER      Pt Name: Sandrine Case  MRN: [id number]  Birthdate 1995  Date of evaluation: 4/18/2024  Provider: Dilip Foote DO  PCP: Brooklyn Jimenez PA-C  Note Started: 3:50 PM EDT 4/18/24    CHIEF COMPLAINT       Chief Complaint   Patient presents with    Post-op Problem     Pt had thyroid biopsy 8 days ago, c/o swelling and pain to neck       HISTORY OF PRESENT ILLNESS: 1 or more Elements   History From: Patient  Limitations to history : None    Sandrine Case is a 28 y.o. female who presents to the ED due to a postop issue.  Patient had a fine-needle biopsy of her thyroid 8 days ago.  She says that since the biopsy she has had worsening edema to her anterior neck.  She says that the edema is significantly worsened than baseline.  She denies any shortness of breath or chest pain associate with the symptoms.  Denies any recent fever or chills.  Denies abdominal pain, nausea or vomiting.  She is still able to swallow and eat without difficulty.    Nursing Notes were all reviewed and agreed with or any disagreements were addressed in the HPI.    REVIEW OF SYSTEMS :    Positives and Pertinent negatives as per HPI.     PAST MEDICAL HISTORY/Chronic Conditions Affecting Care    has no past medical history on file.     SURGICAL HISTORY     Past Surgical History:   Procedure Laterality Date    ADENOIDECTOMY      WISDOM TOOTH EXTRACTION         CURRENTMEDICATIONS       Discharge Medication List as of 4/18/2024 10:26 PM        CONTINUE these medications which have NOT CHANGED    Details   ibuprofen (ADVIL;MOTRIN) 600 MG tablet Take 1 tablet by mouth 3 times daily as needed for Pain Take with food, Disp-30 tablet, R-0Normal      Cinnamon 500 MG CAPS Take 1 capsule by mouth dailyHistorical Med             ALLERGIES     Patient has no known allergies.    FAMILYHISTORY     No family history on file.     SOCIAL HISTORY    PREGNANCY UR-QUAL   POCT GLUCOSE       As interpreted by me, the above displayed labs are abnormal. All other labs obtained during this visit were within normal range or not returned as of this dictation.    RADIOLOGY:   Non-plain film images such as CT, Ultrasound and MRI are read by the radiologist. Plain radiographic images are visualized and preliminarily interpreted by the ED Provider with the below findings:    Interpretation per the Radiologist below, if available at the time of this note:    CT SOFT TISSUE NECK W CONTRAST   Final Result   1. Large mass enlarging the right lobe of the thyroid, measuring 3.3 x 2.3 cm   in cross-section and 4.9 cm in length. The mass has low density regions as   well as nodular areas of density identical to normal thyroid tissue.   Recommend tissue sampling.   2. No cervical lymphadenopathy.           No results found.    No results found.    PROCEDURES   Unless otherwise noted below, none     CRITICAL CARE TIME (.cct)   Per attending attestation    EMERGENCY DEPARTMENT COURSE    Vitals:    Vitals:    04/18/24 1451 04/18/24 2155 04/18/24 2200   BP: 127/76 (!) 83/52 107/71   Pulse: 99 75 100   Resp: 16     Temp: 97.9 °F (36.6 °C)     TempSrc: Oral     SpO2: 100%  100%   Weight: 59 kg (130 lb)     Height: 1.626 m (5' 4\")         Patient was given the following medications:  Medications   iopamidol (ISOVUE-370) 76 % injection 75 mL (75 mLs IntraVENous Given 4/18/24 1857)   dexAMETHasone (DECADRON) injection 10 mg (10 mg IntraVENous Given 4/18/24 2139)         Is this patient to be included in the SEP-1 core measure due to severe sepsis or septic shock? No Exclusion criteria - the patient is NOT to be included for SEP-1 Core Measure due to: Infection is not suspected        Medical Decision Making/Differential Diagnosis:    CC/HPI Summary, Social Determinants of health, Records Reviewed, DDx, testing done/not done, ED Course, Reassessment, disposition considerations/shared decision

## 2024-04-19 ENCOUNTER — TELEPHONE (OUTPATIENT)
Dept: PRIMARY CARE CLINIC | Age: 29
End: 2024-04-19

## 2024-04-19 DIAGNOSIS — E07.9 THYROID MASS: Primary | ICD-10-CM

## 2024-04-19 NOTE — TELEPHONE ENCOUNTER
----- Message from Mike Diaz sent at 4/18/2024 11:40 AM EDT -----  Subject: Results Request    QUESTIONS  Results: bio op thyroid; Ordered by:   Date Performed: 2024-04-10  ---------------------------------------------------------------------------  --------------  CALL BACK INFO    [phone]; OK to leave message on voicemail,OK to respond with electronic   message via [employer] portal (only for patients who have registered [email or web address]   account)  ---------------------------------------------------------------------------  --------------

## 2024-04-19 NOTE — TELEPHONE ENCOUNTER
Phone pt mom told pt that thyroid biopsy was inconclusive  and did not give us enough information. .Brooklyn spoke to Dr West and  told us to call the office and make a urgent appt. discuss with mom about other offices and we felt they were going to take to long .  Mom understands.            Dr West office phoned back and pt has appt on Tuesday 4/23/24 @8:40 am in [place]  .

## 2024-04-19 NOTE — ED NOTES
Patient became diaphoretic, pale, and nauseated following administration of decadron. Vitals obtained, BGL obtained and provider notified, see chart flowsheet. Fluid administration provided, apple juice and crackers provided to the patient.

## 2024-04-23 ENCOUNTER — TELEPHONE (OUTPATIENT)
Dept: PRIMARY CARE CLINIC | Age: 29
End: 2024-04-23

## 2024-04-23 ENCOUNTER — OFFICE VISIT (OUTPATIENT)
Dept: ENT CLINIC | Age: 29
End: 2024-04-23
Payer: COMMERCIAL

## 2024-04-23 VITALS
SYSTOLIC BLOOD PRESSURE: 109 MMHG | DIASTOLIC BLOOD PRESSURE: 69 MMHG | BODY MASS INDEX: 21.89 KG/M2 | WEIGHT: 128.2 LBS | HEART RATE: 121 BPM | HEIGHT: 64 IN

## 2024-04-23 DIAGNOSIS — R93.89 ABNORMAL THYROID ULTRASOUND: Primary | ICD-10-CM

## 2024-04-23 DIAGNOSIS — R22.1 NECK MASS: ICD-10-CM

## 2024-04-23 DIAGNOSIS — R79.89 ABNORMAL THYROID SCREEN (BLOOD): ICD-10-CM

## 2024-04-23 DIAGNOSIS — E04.1 THYROID NODULE: ICD-10-CM

## 2024-04-23 PROCEDURE — 99204 OFFICE O/P NEW MOD 45 MIN: CPT | Performed by: OTOLARYNGOLOGY

## 2024-04-23 PROCEDURE — G8420 CALC BMI NORM PARAMETERS: HCPCS | Performed by: OTOLARYNGOLOGY

## 2024-04-23 PROCEDURE — 1036F TOBACCO NON-USER: CPT | Performed by: OTOLARYNGOLOGY

## 2024-04-23 PROCEDURE — 31575 DIAGNOSTIC LARYNGOSCOPY: CPT | Performed by: OTOLARYNGOLOGY

## 2024-04-23 PROCEDURE — G8427 DOCREV CUR MEDS BY ELIG CLIN: HCPCS | Performed by: OTOLARYNGOLOGY

## 2024-04-23 NOTE — TELEPHONE ENCOUNTER
fyi    Pt mom called letting us know that Dr West is going to remove the right side of thyroid and leave the left side in for now until they do a biopsy she said the second report showed  non cancerous so there not doing surgery until end of may beginning of June.

## 2024-04-23 NOTE — PATIENT INSTRUCTIONS
questions, you can reach them at [phone] (per Pre-Admission testing, EKG is required for all patients age 55+, have a diagnosis of hypertension, diabetes, or on dialysis).           [facility], [address], [place] will call you a couple days prior to surgery and give you further instructions, if you have any questions, you can reach them at [phone] (per Pre-Admission testing, EKG is required for all patients age 55+, have a diagnosis of hypertension, diabetes, or on dialysis).          [facility], [address] [place], [place] will call you a couple days prior to surgery and give you further instructions, if you have any questions, you can reach them at [phone] (per Pre-Admission testing, EKG is required for all patients age 55+, have a diagnosis of hypertension, diabetes, or on dialysis).

## 2024-04-23 NOTE — PROGRESS NOTES
4/23/24: New Pt here for thyroid mass, first noted 8 yrs ago. FNA completed 4/10/24 and CT Soft Tissue completed 4/18/24. No pain. No difficulty swallowing. No change in voice. Drinks 4 bottles of water, 450mg of caffeine, and no alcohol. Patient states she has lost 30 lbs since 10/2023 without diet or activity change.  
cross-section and 4.9 cm in length. The mass has low density regions as  well as nodular areas of density identical to normal thyroid tissue.  Recommend tissue sampling.  2. No cervical lymphadenopathy.    FNA on 4/10/24  THYROID, FINE NEEDLE ASPIRATION, RIGHT:   Satisfactory for evaluation.   INCONCLUSIVE FOR MALIGNANT CELLS.   Follicular neoplasm not excluded.   Follicular cells in crowded clusters and microfollicles and scant   colloid.   [place] System Category 4     Scope 4/23/24  Procedure note- after pt verbally consented, was sprayed nasally with 1:1 neosynephrine and xylocaine. Scope was passed and found nasal cavity with no lesion. np clear, tonsil wnl, tongue mobile and no masses, vocal cords mobile bilaterally with full ab and ad duction. Hypopharynx clear, open and no masses.       Medical Decision Making and Treatment Plan.  1) Plan for today all medical documentation review- DONE  2) CT soft tissue neck - DONE 4/18/24 reviewed  3) US FNA biopsy w AFIRMA- DONE fna results inconclusive4/10/24 but affirma with concerning molecular findings  4) In-office flex scope- DONE  5) Discussed R/B/As to thyroidectomy vs hemithyroidectomy  6) long dw pt and mom on rba of kyaw for her condition. They understood  7. R/B/A of surgery discussed with pt. Pt understood, consent signed, and would like to proceed to surgery. No personal or family history of bleeding or adverse reaction to anesthesia.   8. Will plan for right hemithyroidectomy surgical resection of thyroid mass      I spent 50 minutes with the patients care and >50% of this time on counseling or coordinating care    Thank you for the opportunity to take part in the care of this very pleasant patient, Sandrine Case  Sincerely,            Jerry West M.D., Ph.D., FACS  [facility]   Department of Otolaryngology-Head and Neck Surgery  Chief of Head & Neck Surgical Oncology  Director Head & Neck Oncology Service Line

## 2024-04-26 ENCOUNTER — PREP FOR PROCEDURE (OUTPATIENT)
Dept: ENT CLINIC | Age: 29
End: 2024-04-26

## 2024-04-26 ENCOUNTER — TELEPHONE (OUTPATIENT)
Dept: ENT CLINIC | Age: 29
End: 2024-04-26

## 2024-04-26 DIAGNOSIS — E04.1 THYROID NODULE: ICD-10-CM

## 2024-04-26 NOTE — TELEPHONE ENCOUNTER
Prior Authorization Form:      DEMOGRAPHICS:                     Patient Name:  Jose Case  Patient :  1995            Insurance:  Payor: [facility] / Plan: [facility] MEDICAID / Product Type: *No Product type* /   Insurance ID Number:    Payer/Plan Subscr  Sex Relation Sub. Ins. ID Effective Group Num   [facility] JO[facility] 1995 Female Self [id number] [address] [facility] [address]         DIAGNOSIS & PROCEDURE:                       Procedure/Operation: RIGHT JOSEFA THYROIDECTOMY           CPT Code: 56052    Diagnosis:  RIGHT THYROID NODULE    ICD10 Code: E04.1    Location:  [facility]    Surgeon:  DR ALFONSO    SCHEDULING INFORMATION:                          Date: 24    Time: N/A              Anesthesia:  General                                                       Status:  Outpatient        Special Comments:  PATHOLOGY   NERVE INTEGRITY MONITOR       Electronically signed by Majo Bee MA on 2024 at 10:02 AM

## 2024-04-26 NOTE — TELEPHONE ENCOUNTER
LM on VM to return call about surgery    [place] [facility]    [place] op appt:  5/28/24 at 9:40 [facility]

## 2024-04-29 ENCOUNTER — OFFICE VISIT (OUTPATIENT)
Dept: PRIMARY CARE CLINIC | Age: 29
End: 2024-04-29
Payer: COMMERCIAL

## 2024-04-29 VITALS
TEMPERATURE: 98.1 F | OXYGEN SATURATION: 99 % | HEART RATE: 100 BPM | RESPIRATION RATE: 16 BRPM | WEIGHT: 125 LBS | HEIGHT: 64 IN | BODY MASS INDEX: 21.34 KG/M2 | SYSTOLIC BLOOD PRESSURE: 118 MMHG | DIASTOLIC BLOOD PRESSURE: 60 MMHG

## 2024-04-29 DIAGNOSIS — R30.0 DYSURIA: ICD-10-CM

## 2024-04-29 DIAGNOSIS — B37.83 ANGULAR CHEILITIS WITH CANDIDIASIS: Primary | ICD-10-CM

## 2024-04-29 DIAGNOSIS — N76.0 ACUTE VAGINITIS: ICD-10-CM

## 2024-04-29 DIAGNOSIS — E07.9 THYROID MASS: ICD-10-CM

## 2024-04-29 PROBLEM — E04.1 THYROID NODULE: Status: RESOLVED | Noted: 2024-04-26 | Resolved: 2024-04-29

## 2024-04-29 LAB
BILIRUBIN, POC: NEGATIVE
BLOOD URINE, POC: NORMAL
CLARITY, POC: CLEAR
COLOR, POC: YELLOW
GLUCOSE URINE, POC: NEGATIVE
KETONES, POC: NEGATIVE
LEUKOCYTE EST, POC: NEGATIVE
NITRITE, POC: NEGATIVE
PH, POC: 6.5
PROTEIN, POC: NEGATIVE
SPECIFIC GRAVITY, POC: 1.02
UROBILINOGEN, POC: 0.2

## 2024-04-29 PROCEDURE — G8427 DOCREV CUR MEDS BY ELIG CLIN: HCPCS | Performed by: PHYSICIAN ASSISTANT

## 2024-04-29 PROCEDURE — G8420 CALC BMI NORM PARAMETERS: HCPCS | Performed by: PHYSICIAN ASSISTANT

## 2024-04-29 PROCEDURE — 81002 URINALYSIS NONAUTO W/O SCOPE: CPT | Performed by: PHYSICIAN ASSISTANT

## 2024-04-29 PROCEDURE — 1036F TOBACCO NON-USER: CPT | Performed by: PHYSICIAN ASSISTANT

## 2024-04-29 PROCEDURE — 99214 OFFICE O/P EST MOD 30 MIN: CPT | Performed by: PHYSICIAN ASSISTANT

## 2024-04-29 RX ORDER — FLUCONAZOLE 150 MG/1
150 TABLET ORAL ONCE
Qty: 1 TABLET | Refills: 0 | Status: SHIPPED | OUTPATIENT
Start: 2024-04-29 | End: 2024-04-29

## 2024-04-29 RX ORDER — NYSTATIN 100000 U/G
CREAM TOPICAL
Qty: 15 G | Refills: 0 | Status: SHIPPED | OUTPATIENT
Start: 2024-04-29

## 2024-04-29 NOTE — PROGRESS NOTES
Chief Complaint   Patient presents with    Vaginitis     Also has yeast on her mouth        HPI:  Patient is here for follow-up of apt with Dr West.  We reviewed his note. She is scheduled for surgery on May 20. She complains of burning rash on her corners of the mouth, and tongue. She also have vaginitis. She denies discharge or pain. + dysuria. No frequency, hesitancy or small voids. No low back pain. Her most recent WBC was 8.5, wnl. She continues weight loss.     Patient's past medical, surgical, social and/or family history reviewed, updated in chart, and are non-contributory (unless otherwise stated).  Medications and allergies also reviewed and updated in chart.    Review of Systems:  Constitutional:  No fever, +fatigue, no chills, no headaches, no weight change  Dermatology:  No rash, no mole, no dry or sensitive skin  ENT:  No cough, no sore throat, no sinus pain, no runny nose, no ear pain  Cardiology:  No chest pain, no palpitations, no leg edema, no shortness of breath, no PND  Gastroenterology:  No dysphagia, no abdominal pain, no nausea, no vomiting, no constipation, no diarrhea, no heartburn  Musculoskeletal:  No joint pain, no leg cramps, no back pain, no muscle aches  Respiratory:  No shortness of breath, no orthopnea, no wheezing, no PETERSEN, no hemoptysis  Urology:  No blood in the urine, no urinary frequency, no urinary incontinence, no urinary urgency, no nocturia, no dysuria    Vitals:    04/29/24 0806   BP: 118/60   Pulse: 100   Resp: 16   Temp: 98.1 °F (36.7 °C)   SpO2: 99%   Weight: 56.7 kg (125 lb)   Height: 1.626 m (5' 4.02\")       Physical Exam  Constitutional:       General: She is not in acute distress.     Appearance: Normal appearance. She is well-developed.   HENT:      Head: Normocephalic and atraumatic.      Right Ear: External ear normal.      Left Ear: External ear normal.      Nose: Nose normal.      Mouth/Throat:      Lips: Lesions (erythema at bilat corners) present.

## 2024-04-30 ENCOUNTER — TELEPHONE (OUTPATIENT)
Dept: ENT CLINIC | Age: 29
End: 2024-04-30

## 2024-04-30 ENCOUNTER — PREP FOR PROCEDURE (OUTPATIENT)
Dept: ENT CLINIC | Age: 29
End: 2024-04-30

## 2024-04-30 DIAGNOSIS — E04.1 THYROID NODULE: ICD-10-CM

## 2024-04-30 NOTE — TELEPHONE ENCOUNTER
DEMOGRAPHICS:                      Patient Name:  Jose Case  Patient :  1995                                                  Insurance:  Payor: [facility] / Plan: [facility] MEDICAID / Product Type: *No Product type* /   Insurance ID Number:    Payer/Plan Subscr  Sex Relation Sub. Ins. ID Effective Group Num   [facility] 1995 Female Self [id number] [address]               DIAGNOSIS & PROCEDURE:                        Procedure/Operation: RIGHT JOSEFA THYROIDECTOMY                                                                                CPT Code: 45546     Diagnosis:  RIGHT THYROID NODULE     ICD10 Code: E04.1     [place]  [facility] [place]geon:  DR CHAVEZ     SCHEDULING INFORMATION:                           Date: 24                          Time: N/A                    Anesthesia:  General                                                       Status:  Outpatient         Special Comments:  PATHOLOGY   NERVE INTEGRITY MONITOR        Electronically signed by Majo Bee MA on 2024 at 10:02 AM

## 2024-04-30 NOTE — TELEPHONE ENCOUNTER
Spoke with patient. She is ok with Dr. Raphael doing the sx on 6/20/24 and post op appt on 6/28/24

## 2024-05-03 ENCOUNTER — TELEPHONE (OUTPATIENT)
Dept: PRIMARY CARE CLINIC | Age: 29
End: 2024-05-03

## 2024-05-03 NOTE — TELEPHONE ENCOUNTER
----- Message from Fatou Rivera sent at 5/3/2024 11:59 AM EDT -----  Subject: Message to Provider    QUESTIONS  Information for Provider? Pt mom Suki called maia d want to let pcp know   that surgery has been r/s and was told to call in for a refill of   medication fluconazole (DIFLUCAN) 150 MG tablet pharmcy -[place]   ---------------------------------------------------------------------------  --------------  CALL BACK INFO  [phone]; OK to leave message on voicemail  ---------------------------------------------------------------------------  --------------  SCRIPT ANSWERS  Relationship to Patient? Other/Third Party  Representative Name? Suki   Is the representative on the Communication Release of Information (NAVEED)   form in Epic? Yes

## 2024-05-03 NOTE — TELEPHONE ENCOUNTER
----- Message from Fatou Rivera sent at 5/3/2024 11:59 AM EDT -----  Subject: Message to Provider    QUESTIONS  Information for Provider? Pt mom Suki called maia d want to let pcp know   that surgery has been r/s and was told to call in for a refill of   medication fluconazole (DIFLUCAN) 150 MG tablet pharmcy -[place]   ---------------------------------------------------------------------------  --------------  CALL BACK INFO  [phone]; OK to leave message on voicemail  ---------------------------------------------------------------------------  --------------  SCRIPT ANSWERS  Relationship to Patient? Other/Third Party  Representative Name? Suki   Is the representative on the Communication Release of Information (NAVEED)   form in Epic? Yes

## 2024-05-07 ENCOUNTER — TELEPHONE (OUTPATIENT)
Dept: PRIMARY CARE CLINIC | Age: 29
End: 2024-05-07

## 2024-05-07 ENCOUNTER — TELEPHONE (OUTPATIENT)
Dept: ENT CLINIC | Age: 29
End: 2024-05-07

## 2024-05-07 DIAGNOSIS — R63.4 WEIGHT LOSS: Primary | ICD-10-CM

## 2024-05-07 DIAGNOSIS — E07.9 THYROID MASS: ICD-10-CM

## 2024-05-07 NOTE — TELEPHONE ENCOUNTER
Patient was seen by Dr. West 4/23/24 recommended hemithyroidectomy patient was scheduled with Dr. West 5/20/24 surgery however had to be rescheduled due to physician on medical leave. Patient is scheduled in June with Dr. Raphael for surgery and pcp is concerned patient's grey and not well on physical exam had fna and US of thyroid has a thyroid mass and nodules pcp is concerned for patient and would like surgery to be moved up to May if possible. Advised would correspond with provider and scheduling to see if surgery can be moved up.

## 2024-05-07 NOTE — TELEPHONE ENCOUNTER
Pts mother called back, pt had to be rescheduled with dr pantoja  6/20/24 because dr keane is out for medical leave. She is also requesting a refill on [id number] tab send to [facility] pharmacy    advise

## 2024-05-08 ENCOUNTER — HOSPITAL ENCOUNTER (OUTPATIENT)
Dept: CT IMAGING | Age: 29
Discharge: HOME | End: 2024-05-10
Payer: COMMERCIAL

## 2024-05-08 DIAGNOSIS — E07.9 THYROID MASS: ICD-10-CM

## 2024-05-08 DIAGNOSIS — R63.4 WEIGHT LOSS: ICD-10-CM

## 2024-05-08 PROCEDURE — 6360000004 HC RX CONTRAST MEDICATION: Performed by: RADIOLOGY

## 2024-05-08 PROCEDURE — 74178 CT ABD&PLV WO CNTR FLWD CNTR: CPT

## 2024-05-08 RX ADMIN — IOPAMIDOL 75 ML: 755 INJECTION, SOLUTION INTRAVENOUS at 14:42

## 2024-05-17 ENCOUNTER — OFFICE VISIT (OUTPATIENT)
Dept: PRIMARY CARE CLINIC | Age: 29
End: 2024-05-17
Payer: COMMERCIAL

## 2024-05-17 VITALS
BODY MASS INDEX: 21.17 KG/M2 | HEIGHT: 64 IN | RESPIRATION RATE: 16 BRPM | HEART RATE: 103 BPM | DIASTOLIC BLOOD PRESSURE: 60 MMHG | WEIGHT: 124 LBS | SYSTOLIC BLOOD PRESSURE: 100 MMHG | OXYGEN SATURATION: 98 % | TEMPERATURE: 97.9 F

## 2024-05-17 DIAGNOSIS — R93.5 ABNORMAL CT OF THE ABDOMEN: ICD-10-CM

## 2024-05-17 DIAGNOSIS — R63.4 WEIGHT LOSS: ICD-10-CM

## 2024-05-17 DIAGNOSIS — E07.9 THYROID MASS: Primary | ICD-10-CM

## 2024-05-17 PROCEDURE — 1036F TOBACCO NON-USER: CPT | Performed by: PHYSICIAN ASSISTANT

## 2024-05-17 PROCEDURE — G8427 DOCREV CUR MEDS BY ELIG CLIN: HCPCS | Performed by: PHYSICIAN ASSISTANT

## 2024-05-17 PROCEDURE — G8420 CALC BMI NORM PARAMETERS: HCPCS | Performed by: PHYSICIAN ASSISTANT

## 2024-05-17 PROCEDURE — 99214 OFFICE O/P EST MOD 30 MIN: CPT | Performed by: PHYSICIAN ASSISTANT

## 2024-05-24 NOTE — PROGRESS NOTES
Chief Complaint   Patient presents with    Results     Ct abd and pelvis       HPI:  Patient is here for follow-up of ct findings.  We discussed these findings. I am concerned that anorexia is an issue. She repeatedly says \"I eat.\" She used to weigh > 300 lbs. She lost this with diet. Mom agrees that's he eats but is \"picky.\" I tried to explain that some of her sx, such as hair loss and yeast infections can occur with anorexia and nutritional deficiencies. When I asked to have her see someone, she declines bc she doesn't want to gain weight. I explained that my goal is to facilitate good nutrition, not weight gain. She states that she \"never once in my life\" made herself vomit. Mom agrees. Mom also once stated \"did you hear that? No starvation diet.\" She declines that she thinks that her daughter has a problem. Also, she is worried about having thyroid surgery and needing to take thyroid supplement \"that makes you gain weight.\" I definitely have suspicions of anorexia despite her declining this.     IMPRESSION:  1.  No soft tissue mass or metastatic abdominal disease identified.     2.  Cholelithiasis.  No biliary dilatation or acute abdominal disease  identified.     3.  Considerable weight loss compared to prior CT exam and with decreased fat stores.  In addition, subcutaneous fat shows a generalized pattern of  increasing attenuation and this pattern may be seen with anorexia nervosa.  Please correlate clinically.    Patient's past medical, surgical, social and/or family history reviewed, updated in chart, and are non-contributory (unless otherwise stated).  Medications and allergies also reviewed and updated in chart.    Review of Systems:  Constitutional:  No fever, no fatigue, no chills, no headaches, no weight change  Dermatology:  No rash, no mole, no dry or sensitive skin  ENT:  No cough, no sore throat, no sinus pain, no runny nose, no ear pain  Cardiology:  No chest pain, no palpitations, no leg

## 2024-05-31 ENCOUNTER — CLINICAL DOCUMENTATION (OUTPATIENT)
Dept: ONCOLOGY | Age: 29
End: 2024-05-31

## 2024-05-31 NOTE — PROGRESS NOTES
Referral received. This patient does not currently follow with [facility], nor does she seem to have an active cancer diagnosis. I will be unable to follow with her at this time. She should be referred to the general outpatient dietitian. For a referral fax form, please call [phone]. If pt is in need of oncology nutrition services in the future I would be more than happy to assist at that time. Referral closed. Referring office notified via [employer].  Electronically signed by Melany Stephen MS, RD, LD on 5/31/2024 at 3:50 PM

## 2024-06-03 ENCOUNTER — TELEPHONE (OUTPATIENT)
Dept: PRIMARY CARE CLINIC | Age: 29
End: 2024-06-03

## 2024-06-03 NOTE — TELEPHONE ENCOUNTER
FYI--Per the nutrition referral placed 5/17/2024 the dietician sent referral/message back stating they are unable to see patient because she does not have an active cancer dx and to send her to general outpatient  dietitian.      Faxed referral form to general outpatient dietitian.

## 2024-06-20 ENCOUNTER — TELEPHONE (OUTPATIENT)
Dept: ENT CLINIC | Age: 29
End: 2024-06-20

## 2024-06-20 NOTE — TELEPHONE ENCOUNTER
Patient called in states that she cancelled her surgery, patient does not want to reschedule  Post op appt cancelled

## 2024-08-06 ENCOUNTER — CLINICAL DOCUMENTATION (OUTPATIENT)
Dept: NUTRITION | Age: 29
End: 2024-08-06

## 2024-08-06 NOTE — PROGRESS NOTES
high-quality protein from diet when compared to requirements.     Pt accompanied by her mother for visit. Pt's mother states that she is worried about her daughter's weight loss and fears that she has malnutrition due to not consuming enough food. Pt reports weight loss of 31 lb in the last 9 months (20% weight loss). Pt states that she likes that she has lost weight. Pt reports angular cheilitis, weak immune system, chronic yeast infection.     Pt reports that years ago, when she weighed 300 lb, a doctor told her she was \"hairy and fat.\" Pt's mother reports that she was also bullied in school. This was traumatizing for the pt, Has become hyper-focused on avoiding gaining weight.     Pt reports nodule in thyroid, Pending surgery for partial thyroid removal, and cancer rule-out. Pt reports high anxiety and worry regarding health and pending surgery, which she states causes nausea and low appetite.     Pt states, \"I'm scared of carbohydrates because I used to be 300 lb.\" States that she did not consume fruit for a couple years.      Pt reports that she did not experience huger cues for a while, but recently started to feel hungry. Pt reports normal BM daily. Pt reports high caffeine intake and drinking coffee late in the day. Pt and pt's mother report pt consumes a lot of yellowfin tuna, Mother concerned with mercury content.     Pt and pt's mother state that pt consumes breakfast every day. Pt states, \"I eat a huge breakfast.\" Pt does her own grocery shopping. Pt's mother cooks, but states that pt does not always partake in meals she makes.    Pt's current eating pattern consists of 1-2 meals + 1-2 snacks per day. Reports minimal intake at lunch and skips dinner. Inadequate protein intake, Inconsistent complex carbohydrate intake, Inadequate fiber intake, Inadequate intake of fruit and non starchy vegetables,  Inadequate water intake, Moderate to high intake of added sugars.     Pt states over and over that she is

## 2024-10-07 ENCOUNTER — TELEPHONE (OUTPATIENT)
Dept: PRIMARY CARE CLINIC | Age: 29
End: 2024-10-07

## 2024-10-07 NOTE — TELEPHONE ENCOUNTER
Pt needs a letter stating she is unable to work due to health issues and needing surgery for thyroid issue for [employer] please call her mom when done [phone]

## 2024-11-15 ENCOUNTER — OFFICE VISIT (OUTPATIENT)
Dept: PRIMARY CARE CLINIC | Age: 29
End: 2024-11-15
Payer: COMMERCIAL

## 2024-11-15 VITALS
OXYGEN SATURATION: 100 % | HEIGHT: 64 IN | RESPIRATION RATE: 18 BRPM | DIASTOLIC BLOOD PRESSURE: 82 MMHG | TEMPERATURE: 98 F | HEART RATE: 100 BPM | WEIGHT: 125 LBS | SYSTOLIC BLOOD PRESSURE: 122 MMHG | BODY MASS INDEX: 21.34 KG/M2

## 2024-11-15 DIAGNOSIS — E63.9 NUTRITIONAL DEFICIENCY DUE TO EATING DISORDER: ICD-10-CM

## 2024-11-15 DIAGNOSIS — E04.1 THYROID NODULE: Primary | ICD-10-CM

## 2024-11-15 DIAGNOSIS — F50.011 MODERATE RESTRICTING TYPE ANOREXIA NERVOSA: ICD-10-CM

## 2024-11-15 DIAGNOSIS — F50.9 NUTRITIONAL DEFICIENCY DUE TO EATING DISORDER: ICD-10-CM

## 2024-11-15 PROCEDURE — G8420 CALC BMI NORM PARAMETERS: HCPCS | Performed by: PHYSICIAN ASSISTANT

## 2024-11-15 PROCEDURE — G8427 DOCREV CUR MEDS BY ELIG CLIN: HCPCS | Performed by: PHYSICIAN ASSISTANT

## 2024-11-15 PROCEDURE — G8484 FLU IMMUNIZE NO ADMIN: HCPCS | Performed by: PHYSICIAN ASSISTANT

## 2024-11-15 PROCEDURE — 99214 OFFICE O/P EST MOD 30 MIN: CPT | Performed by: PHYSICIAN ASSISTANT

## 2024-11-15 PROCEDURE — 1036F TOBACCO NON-USER: CPT | Performed by: PHYSICIAN ASSISTANT

## 2024-11-15 NOTE — PROGRESS NOTES
as soon as possible if overdue, as this is important in assessing for undiagnosed pathology, especially cancer, as well as protecting against potentially harmful/life threatening disease.        Patient and/or guardian verbalizes understanding and agrees with above counseling, assessment and plan.    All questions answered.    GUSTAVO WRIGHT PA-C  11/15/2024    I have personally reviewed and updated the chief complaint, HPI, Past Medical, Family and Social History, as well as the above Review of Systems.

## 2025-04-11 ENCOUNTER — OFFICE VISIT (OUTPATIENT)
Dept: ENT CLINIC | Age: 30
End: 2025-04-11
Payer: COMMERCIAL

## 2025-04-11 VITALS
HEART RATE: 77 BPM | OXYGEN SATURATION: 99 % | HEIGHT: 64 IN | BODY MASS INDEX: 22.36 KG/M2 | SYSTOLIC BLOOD PRESSURE: 116 MMHG | DIASTOLIC BLOOD PRESSURE: 79 MMHG | WEIGHT: 131 LBS

## 2025-04-11 DIAGNOSIS — E04.1 THYROID NODULE: Primary | ICD-10-CM

## 2025-04-11 PROCEDURE — G8427 DOCREV CUR MEDS BY ELIG CLIN: HCPCS | Performed by: OTOLARYNGOLOGY

## 2025-04-11 PROCEDURE — G8420 CALC BMI NORM PARAMETERS: HCPCS | Performed by: OTOLARYNGOLOGY

## 2025-04-11 PROCEDURE — 1036F TOBACCO NON-USER: CPT | Performed by: OTOLARYNGOLOGY

## 2025-04-11 PROCEDURE — 99213 OFFICE O/P EST LOW 20 MIN: CPT | Performed by: OTOLARYNGOLOGY

## 2025-04-11 NOTE — PROGRESS NOTES
[facility]  Dr. Hugh Raphael D.O. Ms.Ed.  New Consult       Patient Name:  Sandrine Case  :  1995     CHIEF C/O:    Chief Complaint   Patient presents with    Follow-up     thyroid nodule discuss surgery       HISTORY OBTAINED FROM:  patient    HISTORY OF PRESENT ILLNESS:          Patient previously followed with Dr. West. First noticed in 2019 and first noticed at [facility] a year ago which started workuip as prior to that patient thought normal. Progressive dysphagia now having trouble with soft food getting stuck. No voice change. Denied unexpected weight loss, fever, chills, night sweats. Feels when swallows. Denied trouble laying flat or shortness of breath.  Hx adenoidectomy. Previously scheduled for thyroidectomy cancled  \" scared\" per patient.    fna results inconclusive4/10/24 but affirma with concerning molecular findings     Past Medical History:   Diagnosis Date    Yeast infection 2024     Past Surgical History:   Procedure Laterality Date    ADENOIDECTOMY      WISDOM TOOTH EXTRACTION         Current Outpatient Medications:     nystatin (MYCOSTATIN) 100490 UNIT/GM cream, Apply topically 2 times daily. (Patient not taking: Reported on 2024), Disp: 15 g, Rfl: 0    Vitamin D3 125 MCG (5000 UT) TABS tablet, Take 1 tablet by mouth daily (Patient not taking: Reported on 11/15/2024), Disp: , Rfl:     ibuprofen (ADVIL;MOTRIN) 600 MG tablet, Take 1 tablet by mouth 3 times daily as needed for Pain Take with food (Patient not taking: Reported on 11/15/2024), Disp: 30 tablet, Rfl: 0    Cinnamon 500 MG CAPS, Take 1 capsule by mouth daily, Disp: , Rfl:   Patient has no known allergies.  Social History     Tobacco Use    Smoking status: Never    Smokeless tobacco: Never   Substance Use Topics    Alcohol use: Yes     Comment: occassionally    Drug use: No     No family history on file.    Review of Systems   Constitutional:  Negative for chills and fever.   HENT:  Positive

## 2025-04-14 ENCOUNTER — PREP FOR PROCEDURE (OUTPATIENT)
Dept: ENT CLINIC | Age: 30
End: 2025-04-14

## 2025-04-14 DIAGNOSIS — E04.1 RIGHT THYROID NODULE: ICD-10-CM

## 2025-05-01 ENCOUNTER — HOSPITAL ENCOUNTER (OUTPATIENT)
Dept: ULTRASOUND IMAGING | Age: 30
Discharge: HOME | End: 2025-05-03
Payer: COMMERCIAL

## 2025-05-01 DIAGNOSIS — E04.1 THYROID NODULE: ICD-10-CM

## 2025-05-01 PROCEDURE — 76536 US EXAM OF HEAD AND NECK: CPT

## 2025-05-28 ENCOUNTER — PATIENT MESSAGE (OUTPATIENT)
Dept: PRIMARY CARE CLINIC | Age: 30
End: 2025-05-28

## 2025-05-29 ENCOUNTER — PATIENT MESSAGE (OUTPATIENT)
Dept: PRIMARY CARE CLINIC | Age: 30
End: 2025-05-29

## 2025-05-29 NOTE — PROGRESS NOTES
[facility] PRE-ADMISSION TESTING INSTRUCTIONS    The Preadmission Testing patient is instructed accordingly using the following criteria (check applicable):    ARRIVAL INSTRUCTIONS:   Arrival Time: 0930    [x] Parking the day of Surgery is located in the Main Entrance lot.  Upon entering through the main entrance (Entrance A) make an immediate right to the surgery reception desk    [x] Bring photo ID and insurance card    [x] Please be sure to arrange for a responsible adult to provide transportation to and from the hospital    [x] Please arrange for a responsible adult to be with you for the 24 hour period post procedure, due to having anesthesia    [x] Please notify surgeon if you develop any illness between now and time of surgery (cold, cough, sore throat, fever, nausea, vomiting) or any signs of infections  including skin, wounds, and dental.    [x] If you awake am of surgery not feeling well or have temperature >100 please call [phone].    GENERAL INSTRUCTIONS:    [x] No solid foods after midnight, may have up to 8oz of water until 4 hours prior to surgery. No gum, no candy, no mints.  NPO time: 0730       [x] You may brush your teeth    [x] Stop herbal supplements and vitamins 5 days prior to procedure    [x] Follow preop dosing of blood thinners per physician instructions    [x] Bring urine specimen day of surgery    [x] Shower or bath with soap, lather and rinse well, AM of Surgery, no lotion, powders or creams to surgical site    [x] Please do not wear any nail polish, make up, hair products, body spray, aftershave, cologne or perfume on the day of surgery    [x] Jewelry, body piercings, eyeglasses, contact lenses and dentures are not permitted into surgery (bring cases)    [x] No tobacco products within 24 hours of surgery     [x] No alcohol or illegal drug use, marijuana included, within 24 hours of surgery.    [x] You can expect a call the business day prior to procedure

## 2025-06-01 ENCOUNTER — ANESTHESIA EVENT (OUTPATIENT)
Dept: OPERATING ROOM | Age: 30
End: 2025-06-01
Payer: COMMERCIAL

## 2025-06-01 NOTE — H&P
[facility]  Dr. Hugh Raphael D.O. Ms.Ed.  New Consult         Patient Name:  Sandrine Case  :  1995      CHIEF C/O:         Chief Complaint   Patient presents with    Follow-up       thyroid nodule discuss surgery         HISTORY OBTAINED FROM:  patient     HISTORY OF PRESENT ILLNESS:          Patient previously followed with Dr. West. First noticed in 2019 and first noticed at [facility] a year ago which started workuip as prior to that patient thought normal. Progressive dysphagia now having trouble with soft food getting stuck. No voice change. Denied unexpected weight loss, fever, chills, night sweats. Feels when swallows. Denied trouble laying flat or shortness of breath.  Hx adenoidectomy. Previously scheduled for thyroidectomy cancled  \" scared\" per patient.     fna results inconclusive4/10/24 but affirma with concerning molecular findings      Past Medical History        Past Medical History:   Diagnosis Date    Yeast infection 2024         Past Surgical History         Past Surgical History:   Procedure Laterality Date    ADENOIDECTOMY        WISDOM TOOTH EXTRACTION               Current Medication      Current Outpatient Medications:     nystatin (MYCOSTATIN) 885549 UNIT/GM cream, Apply topically 2 times daily. (Patient not taking: Reported on 2024), Disp: 15 g, Rfl: 0    Vitamin D3 125 MCG (5000 UT) TABS tablet, Take 1 tablet by mouth daily (Patient not taking: Reported on 11/15/2024), Disp: , Rfl:     ibuprofen (ADVIL;MOTRIN) 600 MG tablet, Take 1 tablet by mouth 3 times daily as needed for Pain Take with food (Patient not taking: Reported on 11/15/2024), Disp: 30 tablet, Rfl: 0    Cinnamon 500 MG CAPS, Take 1 capsule by mouth daily, Disp: , Rfl:      Patient has no known allergies.  Social History   Social History            Tobacco Use    Smoking status: Never    Smokeless tobacco: Never   Substance Use Topics    Alcohol use: Yes       Comment:  occassionally    Drug use: No         Family History   No family history on file.        Review of Systems   Constitutional:  Negative for chills and fever.   HENT:  Positive for trouble swallowing. Negative for sore throat and voice change.    Respiratory:  Negative for shortness of breath and stridor.    Allergic/Immunologic: Negative for environmental allergies.   Psychiatric/Behavioral:  Negative for behavioral problems.    All other systems reviewed and are negative.        /79 (BP Site: Left Upper Arm, Patient Position: Sitting, BP Cuff Size: Medium Adult)   Pulse 77   Ht 1.626 m (5' 4\")   Wt 59.4 kg (131 lb)   SpO2 99%   BMI 22.49 kg/m²   Physical Exam  Constitutional:       Appearance: Normal appearance. She is normal weight.   HENT:      Head: Normocephalic and atraumatic.      Right Ear: Tympanic membrane, ear canal and external ear normal. No drainage.      Left Ear: Tympanic membrane, ear canal and external ear normal. No drainage.      Nose: Nose normal. No nasal deformity or septal deviation.      Mouth/Throat:      Mouth: Mucous membranes are moist.   Eyes:      General: Lids are normal. Vision grossly intact.      Extraocular Movements: Extraocular movements intact.      Conjunctiva/sclera: Conjunctivae normal.      Pupils: Pupils are equal, round, and reactive to light.   Neck:        Comments: Aprx 5 cm right mobile thyroid nodule  Cardiovascular:      Rate and Rhythm: Normal rate.   Pulmonary:      Effort: Pulmonary effort is normal.   Musculoskeletal:         General: Normal range of motion.      Cervical back: Normal range of motion.   Lymphadenopathy:      Cervical: No cervical adenopathy.   Skin:     Capillary Refill: Capillary refill takes less than 2 seconds.   Neurological:      Mental Status: She is alert.   Psychiatric:         Mood and Affect: Mood normal.         @         IMPRESSION:  1. Large mass enlarging the right lobe of the thyroid, measuring 3.3 x 2.3 cm  in

## 2025-06-02 ENCOUNTER — ANESTHESIA (OUTPATIENT)
Dept: OPERATING ROOM | Age: 30
End: 2025-06-02
Payer: COMMERCIAL

## 2025-06-02 ENCOUNTER — HOSPITAL ENCOUNTER (OUTPATIENT)
Age: 30
Setting detail: OUTPATIENT SURGERY
Discharge: HOME | End: 2025-06-02
Attending: OTOLARYNGOLOGY | Admitting: OTOLARYNGOLOGY
Payer: COMMERCIAL

## 2025-06-02 VITALS
SYSTOLIC BLOOD PRESSURE: 96 MMHG | DIASTOLIC BLOOD PRESSURE: 59 MMHG | BODY MASS INDEX: 22.2 KG/M2 | OXYGEN SATURATION: 97 % | TEMPERATURE: 97.2 F | RESPIRATION RATE: 18 BRPM | HEART RATE: 98 BPM | HEIGHT: 64 IN | WEIGHT: 130 LBS

## 2025-06-02 DIAGNOSIS — G89.18 POST-OP PAIN: Primary | ICD-10-CM

## 2025-06-02 DIAGNOSIS — E04.1 RIGHT THYROID NODULE: ICD-10-CM

## 2025-06-02 LAB
HCG, URINE, POC: NEGATIVE
Lab: NORMAL
NEGATIVE QC PASS/FAIL: NORMAL
POSITIVE QC PASS/FAIL: NORMAL

## 2025-06-02 PROCEDURE — 3700000000 HC ANESTHESIA ATTENDED CARE: Performed by: OTOLARYNGOLOGY

## 2025-06-02 PROCEDURE — 3600000003 HC SURGERY LEVEL 3 BASE: Performed by: OTOLARYNGOLOGY

## 2025-06-02 PROCEDURE — 7100000010 HC PHASE II RECOVERY - FIRST 15 MIN: Performed by: OTOLARYNGOLOGY

## 2025-06-02 PROCEDURE — 2500000003 HC RX 250 WO HCPCS

## 2025-06-02 PROCEDURE — 3700000001 HC ADD 15 MINUTES (ANESTHESIA): Performed by: OTOLARYNGOLOGY

## 2025-06-02 PROCEDURE — 2720000010 HC SURG SUPPLY STERILE: Performed by: OTOLARYNGOLOGY

## 2025-06-02 PROCEDURE — 6360000002 HC RX W HCPCS

## 2025-06-02 PROCEDURE — 7100000011 HC PHASE II RECOVERY - ADDTL 15 MIN: Performed by: OTOLARYNGOLOGY

## 2025-06-02 PROCEDURE — 6360000002 HC RX W HCPCS: Performed by: OTOLARYNGOLOGY

## 2025-06-02 PROCEDURE — 6360000002 HC RX W HCPCS: Performed by: ANESTHESIOLOGY

## 2025-06-02 PROCEDURE — 2580000003 HC RX 258: Performed by: ANESTHESIOLOGY

## 2025-06-02 PROCEDURE — 6370000000 HC RX 637 (ALT 250 FOR IP): Performed by: ANESTHESIOLOGY

## 2025-06-02 PROCEDURE — 3600000013 HC SURGERY LEVEL 3 ADDTL 15MIN: Performed by: OTOLARYNGOLOGY

## 2025-06-02 PROCEDURE — 2580000003 HC RX 258

## 2025-06-02 PROCEDURE — 2709999900 HC NON-CHARGEABLE SUPPLY: Performed by: OTOLARYNGOLOGY

## 2025-06-02 PROCEDURE — 7100000000 HC PACU RECOVERY - FIRST 15 MIN: Performed by: OTOLARYNGOLOGY

## 2025-06-02 PROCEDURE — 88307 TISSUE EXAM BY PATHOLOGIST: CPT

## 2025-06-02 PROCEDURE — 60220 PARTIAL REMOVAL OF THYROID: CPT | Performed by: OTOLARYNGOLOGY

## 2025-06-02 PROCEDURE — 7100000001 HC PACU RECOVERY - ADDTL 15 MIN: Performed by: OTOLARYNGOLOGY

## 2025-06-02 RX ORDER — SODIUM CHLORIDE 9 MG/ML
INJECTION, SOLUTION INTRAVENOUS
Status: DISCONTINUED | OUTPATIENT
Start: 2025-06-02 | End: 2025-06-02 | Stop reason: SDUPTHER

## 2025-06-02 RX ORDER — ROCURONIUM BROMIDE 10 MG/ML
INJECTION, SOLUTION INTRAVENOUS
Status: DISCONTINUED | OUTPATIENT
Start: 2025-06-02 | End: 2025-06-02 | Stop reason: SDUPTHER

## 2025-06-02 RX ORDER — LIDOCAINE HYDROCHLORIDE AND EPINEPHRINE 10; 10 MG/ML; UG/ML
INJECTION, SOLUTION INFILTRATION; PERINEURAL PRN
Status: DISCONTINUED | OUTPATIENT
Start: 2025-06-02 | End: 2025-06-02 | Stop reason: ALTCHOICE

## 2025-06-02 RX ORDER — ONDANSETRON 2 MG/ML
INJECTION INTRAMUSCULAR; INTRAVENOUS
Status: DISCONTINUED | OUTPATIENT
Start: 2025-06-02 | End: 2025-06-02 | Stop reason: SDUPTHER

## 2025-06-02 RX ORDER — HYDROMORPHONE HYDROCHLORIDE 2 MG/ML
INJECTION, SOLUTION INTRAMUSCULAR; INTRAVENOUS; SUBCUTANEOUS
Status: DISCONTINUED | OUTPATIENT
Start: 2025-06-02 | End: 2025-06-02 | Stop reason: SDUPTHER

## 2025-06-02 RX ORDER — ONDANSETRON 4 MG/1
4 TABLET, FILM COATED ORAL EVERY 8 HOURS PRN
Qty: 9 TABLET | Refills: 0 | Status: SHIPPED | OUTPATIENT
Start: 2025-06-02 | End: 2025-06-05

## 2025-06-02 RX ORDER — FENTANYL CITRATE 50 UG/ML
25 INJECTION, SOLUTION INTRAMUSCULAR; INTRAVENOUS EVERY 5 MIN PRN
Status: DISCONTINUED | OUTPATIENT
Start: 2025-06-02 | End: 2025-06-02 | Stop reason: HOSPADM

## 2025-06-02 RX ORDER — ACETAMINOPHEN 500 MG
1000 TABLET ORAL ONCE
Status: COMPLETED | OUTPATIENT
Start: 2025-06-02 | End: 2025-06-02

## 2025-06-02 RX ORDER — GLYCOPYRROLATE 0.2 MG/ML
INJECTION INTRAMUSCULAR; INTRAVENOUS
Status: DISCONTINUED | OUTPATIENT
Start: 2025-06-02 | End: 2025-06-02 | Stop reason: SDUPTHER

## 2025-06-02 RX ORDER — DIPHENHYDRAMINE HYDROCHLORIDE 50 MG/ML
12.5 INJECTION, SOLUTION INTRAMUSCULAR; INTRAVENOUS
Status: DISCONTINUED | OUTPATIENT
Start: 2025-06-02 | End: 2025-06-02 | Stop reason: HOSPADM

## 2025-06-02 RX ORDER — SODIUM CHLORIDE 9 MG/ML
INJECTION, SOLUTION INTRAVENOUS PRN
Status: DISCONTINUED | OUTPATIENT
Start: 2025-06-02 | End: 2025-06-02 | Stop reason: HOSPADM

## 2025-06-02 RX ORDER — PROPOFOL 10 MG/ML
INJECTION, EMULSION INTRAVENOUS
Status: DISCONTINUED | OUTPATIENT
Start: 2025-06-02 | End: 2025-06-02 | Stop reason: SDUPTHER

## 2025-06-02 RX ORDER — LABETALOL HYDROCHLORIDE 5 MG/ML
5 INJECTION, SOLUTION INTRAVENOUS
Status: DISCONTINUED | OUTPATIENT
Start: 2025-06-02 | End: 2025-06-02 | Stop reason: HOSPADM

## 2025-06-02 RX ORDER — METOCLOPRAMIDE HYDROCHLORIDE 5 MG/ML
10 INJECTION INTRAMUSCULAR; INTRAVENOUS ONCE
Status: COMPLETED | OUTPATIENT
Start: 2025-06-02 | End: 2025-06-02

## 2025-06-02 RX ORDER — METHOCARBAMOL 100 MG/ML
1000 INJECTION, SOLUTION INTRAMUSCULAR; INTRAVENOUS ONCE
Status: DISCONTINUED | OUTPATIENT
Start: 2025-06-02 | End: 2025-06-02 | Stop reason: HOSPADM

## 2025-06-02 RX ORDER — HYDRALAZINE HYDROCHLORIDE 20 MG/ML
5 INJECTION INTRAMUSCULAR; INTRAVENOUS
Status: DISCONTINUED | OUTPATIENT
Start: 2025-06-02 | End: 2025-06-02 | Stop reason: HOSPADM

## 2025-06-02 RX ORDER — SODIUM CHLORIDE 0.9 % (FLUSH) 0.9 %
5-40 SYRINGE (ML) INJECTION EVERY 12 HOURS SCHEDULED
Status: DISCONTINUED | OUTPATIENT
Start: 2025-06-02 | End: 2025-06-02 | Stop reason: HOSPADM

## 2025-06-02 RX ORDER — DEXAMETHASONE SODIUM PHOSPHATE 4 MG/ML
INJECTION, SOLUTION INTRA-ARTICULAR; INTRALESIONAL; INTRAMUSCULAR; INTRAVENOUS; SOFT TISSUE
Status: DISCONTINUED | OUTPATIENT
Start: 2025-06-02 | End: 2025-06-02 | Stop reason: SDUPTHER

## 2025-06-02 RX ORDER — LIDOCAINE HYDROCHLORIDE 20 MG/ML
INJECTION, SOLUTION EPIDURAL; INFILTRATION; INTRACAUDAL; PERINEURAL
Status: DISCONTINUED | OUTPATIENT
Start: 2025-06-02 | End: 2025-06-02 | Stop reason: SDUPTHER

## 2025-06-02 RX ORDER — HYDROCODONE BITARTRATE AND ACETAMINOPHEN 5; 325 MG/1; MG/1
1 TABLET ORAL EVERY 6 HOURS PRN
Qty: 12 TABLET | Refills: 0 | Status: SHIPPED | OUTPATIENT
Start: 2025-06-02 | End: 2025-06-05

## 2025-06-02 RX ORDER — MIDAZOLAM HYDROCHLORIDE 1 MG/ML
INJECTION, SOLUTION INTRAMUSCULAR; INTRAVENOUS
Status: DISCONTINUED | OUTPATIENT
Start: 2025-06-02 | End: 2025-06-02 | Stop reason: SDUPTHER

## 2025-06-02 RX ORDER — SODIUM CHLORIDE, SODIUM LACTATE, POTASSIUM CHLORIDE, CALCIUM CHLORIDE 600; 310; 30; 20 MG/100ML; MG/100ML; MG/100ML; MG/100ML
INJECTION, SOLUTION INTRAVENOUS CONTINUOUS
Status: DISCONTINUED | OUTPATIENT
Start: 2025-06-02 | End: 2025-06-02 | Stop reason: HOSPADM

## 2025-06-02 RX ORDER — NALOXONE HYDROCHLORIDE 0.4 MG/ML
INJECTION, SOLUTION INTRAMUSCULAR; INTRAVENOUS; SUBCUTANEOUS PRN
Status: DISCONTINUED | OUTPATIENT
Start: 2025-06-02 | End: 2025-06-02 | Stop reason: HOSPADM

## 2025-06-02 RX ORDER — SUCCINYLCHOLINE CHLORIDE 20 MG/ML
INJECTION INTRAMUSCULAR; INTRAVENOUS
Status: DISCONTINUED | OUTPATIENT
Start: 2025-06-02 | End: 2025-06-02 | Stop reason: SDUPTHER

## 2025-06-02 RX ORDER — MEPERIDINE HYDROCHLORIDE 50 MG/ML
12.5 INJECTION INTRAMUSCULAR; INTRAVENOUS; SUBCUTANEOUS ONCE
Status: DISCONTINUED | OUTPATIENT
Start: 2025-06-02 | End: 2025-06-02 | Stop reason: HOSPADM

## 2025-06-02 RX ORDER — SODIUM CHLORIDE 0.9 % (FLUSH) 0.9 %
5-40 SYRINGE (ML) INJECTION PRN
Status: DISCONTINUED | OUTPATIENT
Start: 2025-06-02 | End: 2025-06-02 | Stop reason: HOSPADM

## 2025-06-02 RX ORDER — PROCHLORPERAZINE EDISYLATE 5 MG/ML
5 INJECTION INTRAMUSCULAR; INTRAVENOUS
Status: COMPLETED | OUTPATIENT
Start: 2025-06-02 | End: 2025-06-02

## 2025-06-02 RX ORDER — SODIUM CHLORIDE 9 MG/ML
INJECTION, SOLUTION INTRAVENOUS
Status: DISCONTINUED | OUTPATIENT
Start: 2025-06-02 | End: 2025-06-02

## 2025-06-02 RX ORDER — CEPHALEXIN 500 MG/1
500 CAPSULE ORAL 2 TIMES DAILY
Qty: 14 CAPSULE | Refills: 0 | Status: SHIPPED | OUTPATIENT
Start: 2025-06-02 | End: 2025-06-09

## 2025-06-02 RX ORDER — SODIUM CHLORIDE, SODIUM LACTATE, POTASSIUM CHLORIDE, CALCIUM CHLORIDE 600; 310; 30; 20 MG/100ML; MG/100ML; MG/100ML; MG/100ML
INJECTION, SOLUTION INTRAVENOUS
Status: DISCONTINUED | OUTPATIENT
Start: 2025-06-02 | End: 2025-06-02 | Stop reason: SDUPTHER

## 2025-06-02 RX ORDER — FENTANYL CITRATE 50 UG/ML
INJECTION, SOLUTION INTRAMUSCULAR; INTRAVENOUS
Status: DISCONTINUED | OUTPATIENT
Start: 2025-06-02 | End: 2025-06-02 | Stop reason: SDUPTHER

## 2025-06-02 RX ADMIN — PHENYLEPHRINE HYDROCHLORIDE 10 MCG/MIN: 10 INJECTION INTRAVENOUS at 11:58

## 2025-06-02 RX ADMIN — HYDROMORPHONE HYDROCHLORIDE 1 MG: 2 INJECTION, SOLUTION INTRAMUSCULAR; INTRAVENOUS; SUBCUTANEOUS at 12:39

## 2025-06-02 RX ADMIN — ONDANSETRON 4 MG: 2 INJECTION INTRAMUSCULAR; INTRAVENOUS at 12:33

## 2025-06-02 RX ADMIN — SODIUM CHLORIDE, POTASSIUM CHLORIDE, SODIUM LACTATE AND CALCIUM CHLORIDE: 600; 310; 30; 20 INJECTION, SOLUTION INTRAVENOUS at 11:35

## 2025-06-02 RX ADMIN — FENTANYL CITRATE 50 MCG: 50 INJECTION, SOLUTION INTRAMUSCULAR; INTRAVENOUS at 11:47

## 2025-06-02 RX ADMIN — PROPOFOL 150 MCG/KG/MIN: 10 INJECTION, EMULSION INTRAVENOUS at 11:37

## 2025-06-02 RX ADMIN — MIDAZOLAM 1 MG: 1 INJECTION INTRAMUSCULAR; INTRAVENOUS at 11:40

## 2025-06-02 RX ADMIN — PROCHLORPERAZINE EDISYLATE 5 MG: 5 INJECTION INTRAMUSCULAR; INTRAVENOUS at 14:48

## 2025-06-02 RX ADMIN — WATER 2000 MG: 1 INJECTION INTRAMUSCULAR; INTRAVENOUS; SUBCUTANEOUS at 11:36

## 2025-06-02 RX ADMIN — SODIUM CHLORIDE, POTASSIUM CHLORIDE, SODIUM LACTATE AND CALCIUM CHLORIDE: 600; 310; 30; 20 INJECTION, SOLUTION INTRAVENOUS at 12:40

## 2025-06-02 RX ADMIN — FENTANYL CITRATE 100 MCG: 50 INJECTION, SOLUTION INTRAMUSCULAR; INTRAVENOUS at 11:36

## 2025-06-02 RX ADMIN — LIDOCAINE HYDROCHLORIDE 100 MG: 20 INJECTION, SOLUTION EPIDURAL; INFILTRATION; INTRACAUDAL; PERINEURAL at 11:36

## 2025-06-02 RX ADMIN — FAMOTIDINE 20 MG: 10 INJECTION, SOLUTION INTRAVENOUS at 10:06

## 2025-06-02 RX ADMIN — GLYCOPYRROLATE 0.2 MG: 0.2 INJECTION INTRAMUSCULAR; INTRAVENOUS at 12:51

## 2025-06-02 RX ADMIN — PROPOFOL 150 MG: 10 INJECTION, EMULSION INTRAVENOUS at 11:36

## 2025-06-02 RX ADMIN — SODIUM CHLORIDE 0.2 MCG/KG/HR: 9 INJECTION, SOLUTION INTRAVENOUS at 11:36

## 2025-06-02 RX ADMIN — ACETAMINOPHEN 1000 MG: 500 TABLET ORAL at 10:06

## 2025-06-02 RX ADMIN — DEXAMETHASONE SODIUM PHOSPHATE 10 MG: 4 INJECTION, SOLUTION INTRAMUSCULAR; INTRAVENOUS at 11:36

## 2025-06-02 RX ADMIN — PHENYLEPHRINE HYDROCHLORIDE 100 MCG: 10 INJECTION INTRAVENOUS at 13:11

## 2025-06-02 RX ADMIN — MIDAZOLAM 1 MG: 1 INJECTION INTRAMUSCULAR; INTRAVENOUS at 11:22

## 2025-06-02 RX ADMIN — Medication 40 MG: at 11:36

## 2025-06-02 RX ADMIN — FENTANYL CITRATE 50 MCG: 50 INJECTION, SOLUTION INTRAMUSCULAR; INTRAVENOUS at 12:13

## 2025-06-02 RX ADMIN — ROCURONIUM BROMIDE 10 MG: 10 INJECTION, SOLUTION INTRAVENOUS at 11:36

## 2025-06-02 RX ADMIN — SUCCINYLCHOLINE CHLORIDE 150 MG: 20 INJECTION, SOLUTION INTRAMUSCULAR; INTRAVENOUS at 11:36

## 2025-06-02 RX ADMIN — SODIUM CHLORIDE: 9 INJECTION, SOLUTION INTRAVENOUS at 11:17

## 2025-06-02 RX ADMIN — METOCLOPRAMIDE 10 MG: 5 INJECTION, SOLUTION INTRAMUSCULAR; INTRAVENOUS at 10:07

## 2025-06-02 ASSESSMENT — PAIN - FUNCTIONAL ASSESSMENT
PAIN_FUNCTIONAL_ASSESSMENT: 0-10
PAIN_FUNCTIONAL_ASSESSMENT: ACTIVITIES ARE NOT PREVENTED

## 2025-06-02 ASSESSMENT — PAIN DESCRIPTION - PAIN TYPE
TYPE: SURGICAL PAIN

## 2025-06-02 ASSESSMENT — PAIN DESCRIPTION - ONSET
ONSET: GRADUAL
ONSET: GRADUAL

## 2025-06-02 ASSESSMENT — LIFESTYLE VARIABLES: SMOKING_STATUS: 0

## 2025-06-02 ASSESSMENT — PAIN DESCRIPTION - DESCRIPTORS
DESCRIPTORS: SORE

## 2025-06-02 ASSESSMENT — PAIN DESCRIPTION - ORIENTATION
ORIENTATION: INNER

## 2025-06-02 ASSESSMENT — PAIN SCALES - GENERAL
PAINLEVEL_OUTOF10: 1

## 2025-06-02 ASSESSMENT — PAIN DESCRIPTION - FREQUENCY
FREQUENCY: CONTINUOUS
FREQUENCY: CONTINUOUS

## 2025-06-02 ASSESSMENT — PAIN DESCRIPTION - LOCATION
LOCATION: THROAT

## 2025-06-02 NOTE — ANESTHESIA POSTPROCEDURE EVALUATION
Department of Anesthesiology  Postprocedure Note    Patient: Sandrine Case  MRN: [id number]  YOB: 1995  Date of evaluation: 6/2/2025    Procedure Summary       Date: 06/02/25 Room / Location: [address]    Anesthesia Start: 1117 Anesthesia Stop: 1313    Procedure: RIGHT THYROIDECTOMY WITH NERVE INTEGRITY MONITOR (Neck) Diagnosis:       Right thyroid nodule      (Right thyroid nodule [E04.1])    Surgeons: Hugh Raphael DO Responsible Provider: Hermann Chou DO    Anesthesia Type: General ASA Status: 2            Anesthesia Type: General    Rhonda Phase I: Rhonda Score: 10    Rhonda Phase II:      Anesthesia Post Evaluation    Patient location during evaluation: bedside  Patient participation: complete - patient participated  Level of consciousness: awake  Pain score: 3  Airway patency: patent  Nausea & Vomiting: no vomiting and no nausea  Cardiovascular status: hemodynamically stable  Respiratory status: acceptable  Hydration status: stable  Comments: Patient seen and examined.  Progressing as expected.  No anesthetic related questions or concerns at this time.  Multimodal analgesia pain management approach  Pain management: adequate    No notable events documented.    I agree with the assessment above.  Gabrielle Bond CRNA

## 2025-06-02 NOTE — ANESTHESIA PRE PROCEDURE
Department of Anesthesiology  Preprocedure Note       Name:  Sandrine Case   Age:  29 y.o.  :  1995                                          MRN:  [id number]         Date:  2025      Surgeon: Surgeon(s):  Hugh Raphael DO    Procedure: Procedure(s):  RIGHT THYROIDECTOMY            +++NERVE INTEGRITY MONITOR+++    Medications prior to admission:   Prior to Admission medications    Medication Sig Start Date End Date Taking? Authorizing Provider   Vitamin D3 125 MCG (5000 UT) TABS tablet Take 1 tablet by mouth daily   Yes ProviderSandrine MD   Cinnamon 500 MG CAPS Take 1 capsule by mouth daily   Yes Provider, MD Sandrine       Current medications:    Current Facility-Administered Medications   Medication Dose Route Frequency Provider Last Rate Last Admin    sodium chloride flush 0.9 % injection 5-40 mL  5-40 mL IntraVENous 2 times per day Mounika Marvin DO        sodium chloride flush 0.9 % injection 5-40 mL  5-40 mL IntraVENous PRN Mounika Marvin DO        0.9 % sodium chloride infusion   IntraVENous PRN Mounika Marvin DO        ceFAZolin (ANCEF) 2,000 mg in sterile water 20 mL IV syringe  2,000 mg IntraVENous On Call to OR Mounika Marvin DO        lactated ringers infusion   IntraVENous Continuous Mounika Marvin DO        metoclopramide (REGLAN) injection 10 mg  10 mg IntraVENous Once Hermann Chou, DO        famotidine (PEPCID) 20 MG/2ML 20 mg in sodium chloride (PF) 0.9 % 10 mL injection  20 mg IntraVENous Once Hermann Chou, DO        acetaminophen (TYLENOL) tablet 1,000 mg  1,000 mg Oral Once Hermann Chou T, DO           Allergies:  No Known Allergies    Problem List:    Patient Active Problem List   Diagnosis Code    Migraines G43.909    Acne L70.9    Thyroid nodule E04.1    Moderate restricting type anorexia nervosa (HCC) F50.011    Nutritional deficiency due to eating disorder E63.9, F50.9    Right thyroid nodule E04.1       Past

## 2025-06-02 NOTE — OP NOTE
Operative Note      Patient: Sandrine Case  YOB: 1995  MRN: [id number]    Date of Procedure: 6/2/2025    Pre-Op Diagnosis Codes:      * Right thyroid nodule [E04.1]    Post-Op Diagnosis: Same       Procedure(s):  RIGHT THYROIDECTOMY WITH NERVE INTEGRITY MONITOR    Surgeon(s):  Hugh Raphael DO    Assistant:   Surgical Assistant: Denise Saha  Resident: Mounika Marvin DO; Yonatan Dempsey DO    Anesthesia: General    Estimated Blood Loss (mL): Minimal    Complications: None    Specimens:   ID Type Source Tests Collected by Time Destination   A : right thyroid Tissue Tissue SURGICAL PATHOLOGY Hugh Raphael DO 6/2/2025 1238        Implants:  * No implants in log *      Drains: * No LDAs found *    Findings:  Infection Present At Time Of Surgery (PATOS) (choose all levels that have infection present):  No infection present  Other Findings: Right thyroid nodule    Detailed Description of Procedure:     HISTORY: Sandrine Case is a 29 y.o. female with Right thyroid nodule. She presents today for right hemithyroidectomy. The risks and benefits of this procedure were discussed with the patient. The risks including, but not limited to, pain; bleeding; infection; scarring; damage to surrounding structures; recurrence; and the need for further procedures, were explained. The patient acknowledged and understood the risks, and agreed to continue with the procedure.     PROCEDURE: The patient was brought in the operating room suite. The patient was placed in the supine position.SCD's were in place, and it was confirmed that she received preoperative antibiotics. After establishment of general anesthesia via orotracheal intubation with a nerve integrity monitoring system endotracheal tube, the eyes were protected with Tegaderm. Nerve integrity monitoring system endotracheal tube was confirmed to be working adequately and secured. The area of planned incision  Was injected with 10cc of

## 2025-06-02 NOTE — DISCHARGE INSTRUCTIONS
ENT Post-Op Instructions    Follow up with Dr. Raphael in 1 week(s). CALL OFFICE TO SCHEDULE/CONFIRM APPOINTMENT    Please follow the instructions below:    DIET INSTRUCTIONS:  Regular diet. Start with light meals today and increase as tolerated.    ACTIVITY INSTRUCTIONS:  Increase activity as tolerated    Elevate Head of bed   No heavy lifting or strenuous activity until your cleared at your post-operative appointment   No driving while taking pain medication    WOUND/DRESSING INSTRUCTIONS:  May shower normally in 24 hours from time of surgery. May shower from the neck down tonight.      Ice to areas of pain.     Sutures/Staples to be removed in the office.  You have sutures that dissolve and do not need to be removed.  You have steri-trip bandages (white bandages) over your incision. Leave these in place. Do not remove them. They will fall off in about 1 week. They will curl and peel at the edges, this is normal. They are OK to get wet, but do not soak. Blot dry, do not scrub.   Once the steri strips have fallen off or been removed in the office, place antibiotic ointment on the incision twice a day for 1-2 weeks.   OK to use scar cream after 2 weeks. Use sunscreen when going out in the sun for the first 6 months. Be careful to not extend your head backwards for a few weeks, this puts tension on the incision line and can cause more scarring.      MEDICATION INSTRUCTIONS:  Take medication as prescribed.  When taking pain medications, you may experience dizziness or drowsiness.  Do not drink alcohol or drive when taking these medications.  You may take Ibuprofen (over the counter) as per directions for mild pain.  Do not take any other acetaminophen (Tylenol) products while taking your pain medication.  You may experience constipation while taking narcotic pain medication - You may take over the counter stool softeners: docuscate (Colace) or sennosides S (Senokot - S).   Take Calcium (TUMS) 1,500mg if you  experience numbness or tingling of the fingers or lips. Call the office if this does not resolve.    Call physician for any of the following or for questions/concerns:   Fever over 101° F    Redness, swelling, hardness or warmth at the wound site (s)  Unrelieved nausea/vomiting    Foul smelling or cloudy drainage at the wound site (s)   Unrelieved pain or increase in pain     Increase in shortness of breath

## 2025-06-02 NOTE — H&P
Sandrine Case was seen and re-examined preoperatively today, June 2, 2025.  There was no substantial change in her physical and medical status.  Patient is fit for the proposed surgical procedure.  All questions were appropriately addressed and had no further questions regarding the risks, benefits, and alternatives of the procedure.  Sandrine Case and family wished to proceed.    Mounika Marvin DO  Resident Physician  [facility]  Otolaryngology Residency  6/2/2025  10:07 AM

## 2025-06-03 PROBLEM — G89.18 POST-OP PAIN: Status: ACTIVE | Noted: 2025-06-03

## 2025-06-06 LAB — SURGICAL PATHOLOGY REPORT: NORMAL

## 2025-06-09 ENCOUNTER — TELEPHONE (OUTPATIENT)
Dept: ENT CLINIC | Age: 30
End: 2025-06-09

## 2025-06-09 NOTE — TELEPHONE ENCOUNTER
Good afternoon,     Dr Raphael will review everything with you tomorrow at your post op appointment    This [employer] message has not been read.  Sandrine Boyds to [facility] Clinical Staff (supporting Hugh Raphael DO) (Selected Message)        6/9/25  8:21 AM  I’ve reviewed my surgical pathology, and I have serious concerns.  A multinodular goiter diagnosis does not align with:     My NRAS Q61R mutation.     My SOLITARY tumor’s 6cm size, solid composition, ill-defined margins and smooth, egg-like shape - nothing about it resembled a multinodular goiter.     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     Microscopic description on the pathology report of my tumor? Completely BLANK. Is the microscope in the room with us?     [facility] doesn’t list multinodular goiter as a possible diagnosis for this mutation. My tumor wasn’t worth more than a five-second glance. Just \"Big? Goiter. Done.\" Less effort than a vending machine decision.     I've already been given incorrect ultrasound results, repeatedly asked “What’s wrong with you?” like it was an interrogation.. and now, I’m supposed to accept a pathology report so half-assed, it looks like it clocked out early and took the microscope with it?     Corners have been cut. I want this in the hands of someone who won’t just glance at a big tumor and call it a goiter out of convenience - who recognizes what widely invasive follicular thyroid carcinoma can look like, not just a couple of lazy bastards who diagnose me with whatever's easiest to write down.     I do not want Juve Tomas or Sd Swann MD - individually or together - to review my pathology again. They didn’t even put my tumor under a microscope. After that disgrace of a report, how could I trust anything they’d add?

## 2025-06-10 ENCOUNTER — TELEPHONE (OUTPATIENT)
Dept: ENT CLINIC | Age: 30
End: 2025-06-10

## 2025-06-10 ENCOUNTER — OFFICE VISIT (OUTPATIENT)
Dept: ENT CLINIC | Age: 30
End: 2025-06-10

## 2025-06-10 VITALS
BODY MASS INDEX: 21.34 KG/M2 | OXYGEN SATURATION: 99 % | RESPIRATION RATE: 16 BRPM | HEART RATE: 90 BPM | SYSTOLIC BLOOD PRESSURE: 101 MMHG | DIASTOLIC BLOOD PRESSURE: 67 MMHG | HEIGHT: 64 IN | WEIGHT: 125 LBS

## 2025-06-10 DIAGNOSIS — E04.1 RIGHT THYROID NODULE: Primary | ICD-10-CM

## 2025-06-10 PROCEDURE — 99024 POSTOP FOLLOW-UP VISIT: CPT | Performed by: OTOLARYNGOLOGY

## 2025-06-10 NOTE — TELEPHONE ENCOUNTER
As discussed at my post-op appointment, I want my pathology sent for outside review to a pathologist who specializes in follicular thyroid tumors and can distinguish between follicular adenoma and follicular carcinoma. This requires a full microscopic evaluation, specifically assessing for capsular and vascular invasion, which my current report does not include.     Let me be absolutely clear:     I had a single, 6cm solid tumor, not multiple nodules.     The pathology report lists only one nodule, yet labels it a multinodular goiter, which is inconsistent and incorrect.     The “Microscopic Description” section is completely blank, despite this being a tumor with a known cancer associated mutation (NRAS Q61R).     A follicular neoplasm diagnosis cannot be made without microscopic evaluation - and I refuse to accept the judgment of those who failed to provide one.     I do not want Sd Rader MD, or any pathologist at [facility] involved in reviewing this specimen again. This needs to go to a fresh, qualified, external reviewer.     Please confirm where the slides and blocks can be sent, and who will handle the review of my pathology.

## 2025-06-12 ASSESSMENT — ENCOUNTER SYMPTOMS
SHORTNESS OF BREATH: 0
COUGH: 0
VOMITING: 0

## 2025-06-12 NOTE — PROGRESS NOTES
[facility]  Dr. Hugh Raphael D.O. Ms.Ed        Patient Name:  Sandrine Case  :  1995     CHIEF C/O:    Chief Complaint   Patient presents with    Post-Op Check     post op right kyaw thyroidectomy (       HISTORY OBTAINED FROM:  patient    HISTORY OF PRESENT ILLNESS:       Sandrine is a 29 y.o. year old female, here today for follow up of:         History of Present Illness  The patient presents for evaluation of multinodular goiter.    She has expressed a desire for her pathology report to be reviewed by an external expert, citing concerns about the accuracy of the diagnosis. She is seeking clarification on whether the follicular neoplasm was subjected to microscopic examination and if the NRAS Q61R mutation could potentially be misinterpreted as a multinodular goiter. Additionally, she is interested in understanding the necessity of applying Mederma to the surgical scar and whether it would be beneficial to use sunscreen on the scar.         Past Medical History:   Diagnosis Date    Thyroid nodule     Yeast infection 2024     Past Surgical History:   Procedure Laterality Date    ADENOIDECTOMY      THYROIDECTOMY N/A 2025    RIGHT THYROIDECTOMY WITH NERVE INTEGRITY MONITOR performed by Hugh Raphael DO at [facility] OR    WISDOM TOOTH EXTRACTION         Current Outpatient Medications:     Vitamin D3 125 MCG (5000 UT) TABS tablet, Take 1 tablet by mouth daily, Disp: , Rfl:     Cinnamon 500 MG CAPS, Take 1 capsule by mouth daily, Disp: , Rfl:   Patient has no known allergies.  Social History     Tobacco Use    Smoking status: Never    Smokeless tobacco: Never   Vaping Use    Vaping status: Never Used   Substance Use Topics    Alcohol use: Not Currently    Drug use: No     No family history on file.    Review of Systems   Constitutional:  Negative for chills and fever.   HENT:  Negative for ear discharge and hearing loss.    Respiratory:  Negative for cough and shortness of breath.

## 2025-06-20 LAB — SURGICAL PATHOLOGY REPORT: NORMAL

## 2025-06-24 ENCOUNTER — TELEPHONE (OUTPATIENT)
Dept: ENT CLINIC | Age: 30
End: 2025-06-24

## 2025-06-24 NOTE — TELEPHONE ENCOUNTER
Called patient LM on  to return call to set up an appointment to see Dr Raphael either on Friday 6/27/25 or on Tuesday July 1st.

## 2025-06-27 ENCOUNTER — OFFICE VISIT (OUTPATIENT)
Dept: ENT CLINIC | Age: 30
End: 2025-06-27

## 2025-06-27 VITALS
RESPIRATION RATE: 16 BRPM | OXYGEN SATURATION: 99 % | BODY MASS INDEX: 20.66 KG/M2 | SYSTOLIC BLOOD PRESSURE: 96 MMHG | DIASTOLIC BLOOD PRESSURE: 69 MMHG | HEIGHT: 64 IN | WEIGHT: 121 LBS | HEART RATE: 74 BPM

## 2025-06-27 DIAGNOSIS — E04.1 RIGHT THYROID NODULE: Primary | ICD-10-CM

## 2025-06-27 PROCEDURE — 99024 POSTOP FOLLOW-UP VISIT: CPT | Performed by: OTOLARYNGOLOGY

## 2025-06-27 ASSESSMENT — ENCOUNTER SYMPTOMS
VOMITING: 0
SHORTNESS OF BREATH: 0
COUGH: 0

## 2025-06-27 NOTE — PROGRESS NOTES
[facility]  Dr. Hugh Raphael D.O. Ms.Ed        Patient Name:  Sandrine Case  :  1995     CHIEF C/O:    Chief Complaint   Patient presents with    Post-Op Check     POST OP - right kyaw thyroidectomy         HISTORY OBTAINED FROM:  patient    HISTORY OF PRESENT ILLNESS:       Sandrine is a 29 y.o. year old female, here today for follow up of:         History of Present Illness  The patient presents for evaluation of multinodular goiter.    She has expressed a desire for her pathology report to be reviewed by an external expert, citing concerns about the accuracy of the diagnosis. She is seeking clarification on whether the follicular neoplasm was subjected to microscopic examination and if the NRAS Q61R mutation could potentially be misinterpreted as a multinodular goiter. Additionally, she is interested in understanding the necessity of applying Mederma to the surgical scar and whether it would be beneficial to use sunscreen on the scar.         Past Medical History:   Diagnosis Date    Thyroid nodule     Yeast infection 2024     Past Surgical History:   Procedure Laterality Date    ADENOIDECTOMY      THYROIDECTOMY N/A 2025    RIGHT THYROIDECTOMY WITH NERVE INTEGRITY MONITOR performed by Hugh Raphael DO at [facility] OR    WISDOM TOOTH EXTRACTION         Current Outpatient Medications:     Vitamin D3 125 MCG (5000 UT) TABS tablet, Take 1 tablet by mouth daily, Disp: , Rfl:     Cinnamon 500 MG CAPS, Take 1 capsule by mouth daily, Disp: , Rfl:   Patient has no known allergies.  Social History     Tobacco Use    Smoking status: Never    Smokeless tobacco: Never   Vaping Use    Vaping status: Never Used   Substance Use Topics    Alcohol use: Not Currently    Drug use: No     No family history on file.    Review of Systems   Constitutional:  Negative for chills and fever.   HENT:  Negative for ear discharge and hearing loss.    Respiratory:  Negative for cough and shortness of breath.

## 2025-07-23 ENCOUNTER — TELEPHONE (OUTPATIENT)
Dept: ENT CLINIC | Age: 30
End: 2025-07-23

## 2025-08-13 ENCOUNTER — HOSPITAL ENCOUNTER (OUTPATIENT)
Dept: LAB | Age: 30
Discharge: HOME | End: 2025-08-13
Payer: COMMERCIAL

## 2025-08-13 DIAGNOSIS — E04.1 RIGHT THYROID NODULE: ICD-10-CM

## 2025-08-13 LAB
T4 SERPL-MCNC: 5.8 UG/DL (ref 4.5–11.7)
TSH SERPL DL<=0.05 MIU/L-ACNC: 2.23 UIU/ML (ref 0.27–4.2)

## 2025-08-13 PROCEDURE — 84443 ASSAY THYROID STIM HORMONE: CPT

## 2025-08-13 PROCEDURE — 36415 COLL VENOUS BLD VENIPUNCTURE: CPT

## 2025-08-13 PROCEDURE — 84436 ASSAY OF TOTAL THYROXINE: CPT

## 2025-08-14 ENCOUNTER — HOSPITAL ENCOUNTER (OUTPATIENT)
Dept: ULTRASOUND IMAGING | Age: 30
Discharge: HOME | End: 2025-08-16
Attending: OTOLARYNGOLOGY
Payer: COMMERCIAL

## 2025-08-14 DIAGNOSIS — E04.1 RIGHT THYROID NODULE: ICD-10-CM

## 2025-08-14 PROCEDURE — 76536 US EXAM OF HEAD AND NECK: CPT

## 2025-08-19 ENCOUNTER — OFFICE VISIT (OUTPATIENT)
Dept: ENT CLINIC | Age: 30
End: 2025-08-19
Payer: COMMERCIAL

## 2025-08-19 VITALS
SYSTOLIC BLOOD PRESSURE: 106 MMHG | DIASTOLIC BLOOD PRESSURE: 70 MMHG | WEIGHT: 124 LBS | HEIGHT: 64 IN | RESPIRATION RATE: 16 BRPM | HEART RATE: 63 BPM | OXYGEN SATURATION: 100 % | BODY MASS INDEX: 21.17 KG/M2

## 2025-08-19 DIAGNOSIS — E04.1 THYROID NODULE: ICD-10-CM

## 2025-08-19 DIAGNOSIS — C73 THYROID CARCINOMA (HCC): Primary | ICD-10-CM

## 2025-08-19 DIAGNOSIS — C73 PAPILLARY CARCINOMA, FOLLICULAR VARIANT (HCC): ICD-10-CM

## 2025-08-19 PROCEDURE — G8420 CALC BMI NORM PARAMETERS: HCPCS | Performed by: OTOLARYNGOLOGY

## 2025-08-19 PROCEDURE — 99214 OFFICE O/P EST MOD 30 MIN: CPT | Performed by: OTOLARYNGOLOGY

## 2025-08-19 PROCEDURE — 1036F TOBACCO NON-USER: CPT | Performed by: OTOLARYNGOLOGY

## 2025-08-19 PROCEDURE — G8427 DOCREV CUR MEDS BY ELIG CLIN: HCPCS | Performed by: OTOLARYNGOLOGY

## 2025-08-21 ASSESSMENT — ENCOUNTER SYMPTOMS
COUGH: 0
SHORTNESS OF BREATH: 0
VOMITING: 0

## (undated) DEVICE — BLADE,STAINLESS-STEEL,15,STRL,DISPOSABLE: Brand: MEDLINE

## (undated) DEVICE — 4-PORT MANIFOLD: Brand: NEPTUNE 2

## (undated) DEVICE — SPONGE,PEANUT,XRAY,ST,SM,3/8",5/CARD: Brand: MEDLINE INDUSTRIES, INC.

## (undated) DEVICE — DOUBLE BASIN SET: Brand: MEDLINE INDUSTRIES, INC.

## (undated) DEVICE — Device

## (undated) DEVICE — NEEDLE HYPO 25GA L1.5IN BLU POLYPR HUB S STL REG BVL STR

## (undated) DEVICE — ELECTRODE PT RET AD L9FT HI MOIST COND ADH HYDRGEL CORDED

## (undated) DEVICE — STRIP,CLOSURE,WOUND,MEDI-STRIP,1/2X4: Brand: MEDLINE

## (undated) DEVICE — AGENT HEMSTAT 3GM PURIFIED PLNT STARCH PWD ABSRB ARISTA AH

## (undated) DEVICE — SKN PREP SPNG STKS PVP PNT STR: Brand: MEDLINE INDUSTRIES, INC.

## (undated) DEVICE — CORD,CAUTERY,BIPOLAR,STERILE: Brand: MEDLINE

## (undated) DEVICE — MAGNETIC INSTR DRAPE 20X16: Brand: MEDLINE INDUSTRIES, INC.

## (undated) DEVICE — DISSECTOR ENDOSCP L21CM TIP CURVATURE 40DEG FN CRV JAW VES

## (undated) DEVICE — EMG TUBE 8229707 NIM TRIVANTAGE 7.0MM ID: Brand: NIM TRIVANTAGE™

## (undated) DEVICE — POUCH INSTR W6.75XL11.5IN FRST 2 PKT ADH FOR ORTH AND

## (undated) DEVICE — SYRINGE MED 10ML TRNSLUC BRL PLUNG BLK MRK POLYPR CTRL

## (undated) DEVICE — BLADE ES ELASTOMERIC COAT INSUL DURABLE BEND UPTO 90DEG

## (undated) DEVICE — PROBE 8225101 5PK STD PRASS FL TIP ROHS

## (undated) DEVICE — SOLUTION IRRIG 1000ML 0.9% SOD CHL USP POUR PLAS BTL

## (undated) DEVICE — MASTISOL ADHESIVE LIQ 2/3ML

## (undated) DEVICE — GLOVE ORANGE PI 7 1/2   MSG9075

## (undated) DEVICE — TOWEL,OR,DSP,ST,BLUE,STD,6/PK,12PK/CS: Brand: MEDLINE